# Patient Record
Sex: MALE | Race: BLACK OR AFRICAN AMERICAN | NOT HISPANIC OR LATINO | Employment: FULL TIME | URBAN - METROPOLITAN AREA
[De-identification: names, ages, dates, MRNs, and addresses within clinical notes are randomized per-mention and may not be internally consistent; named-entity substitution may affect disease eponyms.]

---

## 2017-05-31 ENCOUNTER — OFFICE VISIT (OUTPATIENT)
Dept: FAMILY MEDICINE | Facility: CLINIC | Age: 42
End: 2017-05-31
Payer: COMMERCIAL

## 2017-05-31 VITALS
SYSTOLIC BLOOD PRESSURE: 134 MMHG | BODY MASS INDEX: 26.27 KG/M2 | WEIGHT: 187.63 LBS | DIASTOLIC BLOOD PRESSURE: 80 MMHG | TEMPERATURE: 99 F | HEIGHT: 71 IN

## 2017-05-31 DIAGNOSIS — Z00.00 ROUTINE MEDICAL EXAM: Primary | ICD-10-CM

## 2017-05-31 DIAGNOSIS — R20.0 NUMBNESS OF RIGHT HAND: ICD-10-CM

## 2017-05-31 DIAGNOSIS — E66.3 OVERWEIGHT (BMI 25.0-29.9): ICD-10-CM

## 2017-05-31 PROCEDURE — 99386 PREV VISIT NEW AGE 40-64: CPT | Mod: S$GLB,,, | Performed by: INTERNAL MEDICINE

## 2017-05-31 PROCEDURE — 99999 PR PBB SHADOW E&M-EST. PATIENT-LVL III: CPT | Mod: PBBFAC,,, | Performed by: INTERNAL MEDICINE

## 2017-05-31 NOTE — PROGRESS NOTES
HISTORY OF PRESENT ILLNESS:  Marcial Downing is a 42 y.o. male who presents to the clinic today for a routine medical physical exam. He is new to our clinic.      PAST MEDICAL HISTORY:  History reviewed. No pertinent past medical history.    PAST SURGICAL HISTORY:  Past Surgical History:   Procedure Laterality Date    wisdom tooth removal  2014       SOCIAL HISTORY:  Social History     Social History    Marital status: Single     Spouse name: N/A    Number of children: 1    Years of education: N/A     Occupational History    cook      Social History Main Topics    Smoking status: Never Smoker    Smokeless tobacco: Not on file    Alcohol use No    Drug use:      Types: Marijuana    Sexual activity: Yes     Partners: Female     Other Topics Concern    Not on file     Social History Narrative    No narrative on file       FAMILY HISTORY:  Family History   Problem Relation Age of Onset    No Known Problems Mother     No Known Problems Father     No Known Problems Sister     No Known Problems Brother     Cancer Paternal Uncle     No Known Problems Son     Diabetes Neg Hx     Hypertension Neg Hx     Stroke Neg Hx        ALLERGIES AND MEDICATIONS: updated and reviewed.  Review of patient's allergies indicates:  No Known Allergies            SCREENING HISTORY:  Health Maintenance       Date Due Completion Date    Lipid Panel 1974 ---    TETANUS VACCINE 03/02/1992 ---    Influenza Vaccine 08/01/2017 ---            REVIEW OF SYSTEMS:  The patient reports fair dietary habits.  The patient does not exercise regularly.  General ROS: negative for - chills, fever, malaise or weight loss  Psychological ROS: negative for - anxiety, depression, memory difficulties, sleep disturbances or suicidal ideation  Ophthalmic ROS: negative for - blurry vision or eye pain  ENT ROS: negative for - epistaxis, headaches, nasal congestion, oral lesions or sore throat  Allergy and Immunology ROS: negative for -  "hives  Hematological and Lymphatic ROS: negative for - swollen lymph nodes  Endocrine ROS: negative for - polydipsia/polyuria or temperature intolerance  Respiratory ROS: no cough, shortness of breath, or wheezing  Cardiovascular ROS: no chest pain or dyspnea on exertion  Gastrointestinal ROS: no abdominal pain, change in bowel habits, or black or bloody stools  Dermatological ROS: negative for mole changes and rash  Musculoskeletal ROS: negative for - gait disturbance, joint stiffness, joint swelling, muscle pain or muscular weakness  Neurological ROS: no TIA or stroke symptoms  positive for - numbness/tingling in the 4th and 5th digit of his right hand x 6-12 months; no hx of trauma; no neck pain (he is left handed); no weakness  Genito-Urinary ROS: no dysuria, trouble voiding, or hematuria        PHYSICAL EXAM:  Vitals:    05/31/17 1034   BP: 134/80   Temp: 98.7 °F (37.1 °C)     Weight: 85.1 kg (187 lb 9.8 oz)   Height: 5' 10.5" (179.1 cm)   Body mass index is 26.54 kg/m².    Physical Examination: General appearance - alert, well appearing, and in no distress and overweight  Mental status - alert, oriented to person, place, and time, normal mood, behavior, speech, dress, motor activity, and thought processes  Eyes - pupils equal and reactive, extraocular eye movements intact, sclera anicteric  Mouth - mucous membranes moist, pharynx normal without lesions  Neck - supple, no significant adenopathy, carotids upstroke normal bilaterally, no bruits, thyroid exam: thyroid is normal in size without nodules or tenderness  Lymphatics - no palpable lymphadenopathy  Chest - clear to auscultation, no wheezes, rales or rhonchi, symmetric air entry  Heart - normal rate and regular rhythm, no gallops noted  Abdomen - soft, nontender, nondistended, no masses or organomegaly   Male - not examined  Back exam - full range of motion, no tenderness, palpable spasm or pain on motion  Neurological - alert, oriented, normal speech, " no focal findings or movement disorder noted, cranial nerves II through XII intact, negative Tinel's and Phalen's sign bilaterally  Musculoskeletal - no joint tenderness, deformity or swelling, no muscular tenderness noted, normal  strength bilaterally  Extremities - peripheral pulses normal, no pedal edema, no clubbing or cyanosis  Skin - normal coloration and turgor, no rashes, no suspicious skin lesions noted       ASSESSMENT AND PLAN:  1. Routine medical exam  Counseled on age appropriate medical preventative services including age appropriate cancer screenings, age appropriate eye and dental exams, over all nutritional health, need for a consistent exercise regimen, and an over all push towards maintaining a vigorous and active lifestyle.  Counseled on age appropriate vaccines and discussed upcoming health care needs based on age/gender. Discussed good sleep hygiene and stress management.   - CBC auto differential; Future  - Comprehensive metabolic panel; Future  - Lipid panel; Future    2. Numbness of right hand  I'm unsure if this is carpal tunnel syndrome or an ulnar nerve entrapment.  I will check an EMG and address results accordingly.  We may need to refer him to orthopedics for further treatment if symptoms worsen or persist.  - EMG w/ Ultrasound; Future    3.  Overweight  The patient is asked to make an attempt to improve diet and exercise patterns to aid in medical management of this problem.     Return in about 1 year (around 5/31/2018) for annual exam. or sooner as needed.

## 2017-06-05 ENCOUNTER — TELEPHONE (OUTPATIENT)
Dept: FAMILY MEDICINE | Facility: CLINIC | Age: 42
End: 2017-06-05

## 2017-06-07 ENCOUNTER — LAB VISIT (OUTPATIENT)
Dept: LAB | Facility: HOSPITAL | Age: 42
End: 2017-06-07
Attending: INTERNAL MEDICINE
Payer: COMMERCIAL

## 2017-06-07 DIAGNOSIS — Z00.00 ROUTINE MEDICAL EXAM: ICD-10-CM

## 2017-06-07 LAB
ALBUMIN SERPL BCP-MCNC: 3.8 G/DL
ALP SERPL-CCNC: 51 U/L
ALT SERPL W/O P-5'-P-CCNC: 13 U/L
ANION GAP SERPL CALC-SCNC: 6 MMOL/L
AST SERPL-CCNC: 20 U/L
BASOPHILS # BLD AUTO: 0.01 K/UL
BASOPHILS NFR BLD: 0.2 %
BILIRUB SERPL-MCNC: 0.4 MG/DL
BUN SERPL-MCNC: 18 MG/DL
CALCIUM SERPL-MCNC: 9.3 MG/DL
CHLORIDE SERPL-SCNC: 105 MMOL/L
CHOLEST/HDLC SERPL: 4.3 {RATIO}
CO2 SERPL-SCNC: 28 MMOL/L
CREAT SERPL-MCNC: 1.2 MG/DL
DIFFERENTIAL METHOD: ABNORMAL
EOSINOPHIL # BLD AUTO: 0.1 K/UL
EOSINOPHIL NFR BLD: 1.8 %
ERYTHROCYTE [DISTWIDTH] IN BLOOD BY AUTOMATED COUNT: 13.1 %
EST. GFR  (AFRICAN AMERICAN): >60 ML/MIN/1.73 M^2
EST. GFR  (NON AFRICAN AMERICAN): >60 ML/MIN/1.73 M^2
GLUCOSE SERPL-MCNC: 97 MG/DL
HCT VFR BLD AUTO: 43.8 %
HDL/CHOLESTEROL RATIO: 23 %
HDLC SERPL-MCNC: 178 MG/DL
HDLC SERPL-MCNC: 41 MG/DL
HGB BLD-MCNC: 14.7 G/DL
LDLC SERPL CALC-MCNC: 109.6 MG/DL
LYMPHOCYTES # BLD AUTO: 2.5 K/UL
LYMPHOCYTES NFR BLD: 47.7 %
MCH RBC QN AUTO: 28.2 PG
MCHC RBC AUTO-ENTMCNC: 33.6 %
MCV RBC AUTO: 84 FL
MONOCYTES # BLD AUTO: 0.6 K/UL
MONOCYTES NFR BLD: 12.3 %
NEUTROPHILS # BLD AUTO: 1.9 K/UL
NEUTROPHILS NFR BLD: 37.6 %
NONHDLC SERPL-MCNC: 137 MG/DL
PLATELET # BLD AUTO: 181 K/UL
PMV BLD AUTO: 11.5 FL
POTASSIUM SERPL-SCNC: 4 MMOL/L
PROT SERPL-MCNC: 6.9 G/DL
RBC # BLD AUTO: 5.22 M/UL
SODIUM SERPL-SCNC: 139 MMOL/L
TRIGL SERPL-MCNC: 137 MG/DL
WBC # BLD AUTO: 5.14 K/UL

## 2017-06-07 PROCEDURE — 85025 COMPLETE CBC W/AUTO DIFF WBC: CPT

## 2017-06-07 PROCEDURE — 36415 COLL VENOUS BLD VENIPUNCTURE: CPT | Mod: PO

## 2017-06-07 PROCEDURE — 80053 COMPREHEN METABOLIC PANEL: CPT

## 2017-06-07 PROCEDURE — 80061 LIPID PANEL: CPT

## 2017-06-09 ENCOUNTER — PROCEDURE VISIT (OUTPATIENT)
Dept: NEUROLOGY | Facility: CLINIC | Age: 42
End: 2017-06-09
Payer: COMMERCIAL

## 2017-06-09 VITALS — BODY MASS INDEX: 26.27 KG/M2 | TEMPERATURE: 99 F | HEIGHT: 71 IN | WEIGHT: 187.63 LBS

## 2017-06-09 DIAGNOSIS — R20.0 NUMBNESS OF RIGHT HAND: ICD-10-CM

## 2017-06-09 DIAGNOSIS — G56.03 CARPAL TUNNEL SYNDROME, BILATERAL: ICD-10-CM

## 2017-06-09 PROCEDURE — 95911 NRV CNDJ TEST 9-10 STUDIES: CPT | Mod: S$GLB,,, | Performed by: NEUROLOGICAL SURGERY

## 2017-06-09 PROCEDURE — 95886 MUSC TEST DONE W/N TEST COMP: CPT | Mod: S$GLB,,, | Performed by: NEUROLOGICAL SURGERY

## 2017-06-09 PROCEDURE — 99205 OFFICE O/P NEW HI 60 MIN: CPT | Mod: 25,S$GLB,, | Performed by: NEUROLOGICAL SURGERY

## 2017-06-09 NOTE — PROCEDURES
Chief Complaint   Patient presents with    Other     EMG        Carpal Tunnel Syndrome  Patient presents for evaluation of pain in hands, hand paresthesias and possible carpal tunnel syndrome. Onset of the symptoms was 2 years ago. Current symptoms include: pain involving the fingers of the hand, tingling/numbness involving the fingers of the hand and weakness involving the fingers of the hand. 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 Symptoms have gradually worsened. Evaluation to date: none. Treatment to date: none. He says that he is using his right hand more than his left though he is left handed since he works as a . He has noticed that every morning he has been having tingling in the fingers and feels that his ring finger on the right hand has been stuck requiring to have to use his other hand to open his fingers. He says that his symptoms began about two years ago. Her has not used any braces at night in the past. He has not family history of muscle or nerve diseases.    PAST MEDICAL HISTORY:  No past medical history on file.    PAST SURGICAL HISTORY:  Past Surgical History:   Procedure Laterality Date    wisdom tooth removal  2014       SOCIAL HISTORY:  Social History     Social History    Marital status: Single     Spouse name: N/A    Number of children: 1    Years of education: N/A     Occupational History    cook      Social History Main Topics    Smoking status: Never Smoker    Smokeless tobacco: Not on file    Alcohol use No    Drug use:      Types: Marijuana    Sexual activity: Yes     Partners: Female     Other Topics Concern    Not on file     Social History Narrative    No narrative on file       FAMILY HISTORY:  Family History   Problem Relation Age of Onset    No Known Problems Mother     No Known Problems Father     No Known Problems Sister     No Known Problems Brother     Cancer Paternal Uncle     No Known Problems Son      Diabetes Neg Hx     Hypertension Neg Hx     Stroke Neg Hx        ALLERGIES AND MEDICATIONS: updated and reviewed.  Review of patient's allergies indicates:  No Known Allergies  No current outpatient prescriptions on file.     No current facility-administered medications for this visit.        Review of Systems   Constitutional: Negative for activity change, fatigue and unexpected weight change.   HENT: Negative for trouble swallowing and voice change.    Eyes: Negative for photophobia, pain and visual disturbance.   Respiratory: Negative for apnea and shortness of breath.    Cardiovascular: Negative for chest pain and palpitations.   Gastrointestinal: Negative for constipation, nausea and vomiting.   Genitourinary: Negative for difficulty urinating.   Musculoskeletal: Positive for arthralgias. Negative for back pain, gait problem, myalgias and neck pain.   Skin: Negative for color change and rash.   Neurological: Positive for weakness and numbness. Negative for dizziness, seizures, syncope, speech difficulty, light-headedness and headaches.   Psychiatric/Behavioral: Negative for agitation, behavioral problems and confusion.       Neurologic Exam     Mental Status   Oriented to person, place, and time.   Registration: recalls 3 of 3 objects.   Attention: normal. Concentration: normal.   Speech: speech is normal   Level of consciousness: alert  Knowledge: good.     Cranial Nerves     CN II   Visual fields full to confrontation.   Right visual field deficit: none  Left visual field deficit: none     CN III, IV, VI   Pupils are equal, round, and reactive to light.  Extraocular motions are normal.   Right pupil: Size: 3 mm. Shape: regular. Accommodation: intact.   Left pupil: Size: 3 mm. Shape: regular. Accommodation: intact.   CN III: no CN III palsy  CN VI: no CN VI palsy  Nystagmus: none   Diplopia: none  Ophthalmoparesis: none  Upgaze: normal  Downgaze: normal  Conjugate gaze: present    CN V   Facial sensation  intact.   Right facial sensation deficit: none  Left facial sensation deficit: none    CN VII   Facial expression full, symmetric.   Right facial weakness: none  Left facial weakness: none    CN VIII   CN VIII normal.     CN IX, X   CN IX normal.   CN X normal.   Palate: symmetric    CN XI   CN XI normal.   Right sternocleidomastoid strength: normal  Left sternocleidomastoid strength: normal  Right trapezius strength: normal  Left trapezius strength: normal    CN XII   CN XII normal.   Tongue deviation: none    Motor Exam   Muscle bulk: normal  Overall muscle tone: normal  Right arm tone: normal  Left arm tone: normal  Right leg tone: normal  Left leg tone: normal    Strength   Strength 5/5 except as noted.   Right interossei: 4/5  Left interossei: 4/5    Sensory Exam   Right arm light touch: decreased from fingers  Left arm light touch: decreased from fingers  Right leg light touch: normal  Left leg light touch: normal  Right arm vibration: decreased from fingers  Left arm vibration: decreased from fingers  Right leg vibration: normal  Left leg vibration: normal  Right arm proprioception: normal  Left arm proprioception: normal  Right leg proprioception: normal  Left leg proprioception: normal  Right arm pinprick: decreased from fingers  Left arm pinprick: decreased from fingers  Right leg pinprick: normal  Left leg pinprick: normal  Sensory deficit distribution on right: median  Sensory deficit distribution on left: median    Gait, Coordination, and Reflexes     Gait  Gait: normal    Coordination   Romberg: negative  Finger to nose coordination: normal  Heel to shin coordination: normal  Tandem walking coordination: normal    Tremor   Resting tremor: absent    Reflexes   Right brachioradialis: 2+  Left brachioradialis: 2+  Right biceps: 2+  Left biceps: 2+  Right triceps: 2+  Left triceps: 2+  Right patellar: 2+  Left patellar: 2+  Right achilles: 2+  Left achilles: 2+  Right plantar: normal  Left plantar:  "normal      Physical Exam   Constitutional: He is oriented to person, place, and time. He appears well-developed and well-nourished.   HENT:   Head: Normocephalic and atraumatic.   Eyes: EOM are normal. Pupils are equal, round, and reactive to light.   Cardiovascular: Intact distal pulses.    Pulmonary/Chest: Effort normal. No respiratory distress.   Musculoskeletal: Normal range of motion.   Neurological: He is alert and oriented to person, place, and time. He has a normal Finger-Nose-Finger Test, a normal Heel to Shin Test, a normal Romberg Test and a normal Tandem Gait Test. Gait normal.   Reflex Scores:       Tricep reflexes are 2+ on the right side and 2+ on the left side.       Bicep reflexes are 2+ on the right side and 2+ on the left side.       Brachioradialis reflexes are 2+ on the right side and 2+ on the left side.       Patellar reflexes are 2+ on the right side and 2+ on the left side.       Achilles reflexes are 2+ on the right side and 2+ on the left side.  Skin: Skin is warm and dry.   Psychiatric: He has a normal mood and affect. His speech is normal and behavior is normal.       Vitals:    06/09/17 0945   Temp: 98.5 °F (36.9 °C)   Weight: 85.1 kg (187 lb 9.8 oz)   Height: 5' 10.5" (1.791 m)       Assessment & Plan:    Problem List Items Addressed This Visit     Carpal tunnel syndrome, bilateral    Overview     Mild-moderate CTS on the right. Moderate on the left.         Current Assessment & Plan     Discussed using wrist braces at night to help to minimize symptoms. Would likely benefit from evaluation with hand specialist.           Other Visit Diagnoses     Numbness of right hand            See EMG note in the chart  Follow-up: Return if symptoms worsen or fail to improve.  "

## 2017-06-09 NOTE — ASSESSMENT & PLAN NOTE
Discussed using wrist braces at night to help to minimize symptoms. Would likely benefit from evaluation with hand specialist.

## 2017-10-11 ENCOUNTER — HOSPITAL ENCOUNTER (EMERGENCY)
Facility: HOSPITAL | Age: 42
Discharge: HOME | End: 2017-10-11
Attending: EMERGENCY MEDICINE
Payer: COMMERCIAL

## 2017-10-11 VITALS
SYSTOLIC BLOOD PRESSURE: 138 MMHG | HEART RATE: 70 BPM | WEIGHT: 190 LBS | BODY MASS INDEX: 28.14 KG/M2 | RESPIRATION RATE: 16 BRPM | TEMPERATURE: 98 F | HEIGHT: 69 IN | OXYGEN SATURATION: 98 % | DIASTOLIC BLOOD PRESSURE: 70 MMHG

## 2017-10-11 DIAGNOSIS — S61.511A LACERATION OF RIGHT WRIST, INITIAL ENCOUNTER: Primary | ICD-10-CM

## 2017-10-11 PROCEDURE — 90715 TDAP VACCINE 7 YRS/> IM: CPT | Performed by: EMERGENCY MEDICINE

## 2017-10-11 PROCEDURE — 63600175 PHARM REV CODE 636 W HCPCS: Performed by: EMERGENCY MEDICINE

## 2017-10-11 PROCEDURE — 99283 EMERGENCY DEPT VISIT LOW MDM: CPT | Mod: 25

## 2017-10-11 PROCEDURE — 12031 INTMD RPR S/A/T/EXT 2.5 CM/<: CPT

## 2017-10-11 PROCEDURE — 25000003 PHARM REV CODE 250: Performed by: EMERGENCY MEDICINE

## 2017-10-11 PROCEDURE — 90471 IMMUNIZATION ADMIN: CPT | Performed by: EMERGENCY MEDICINE

## 2017-10-11 RX ORDER — CEPHALEXIN 500 MG/1
500 CAPSULE ORAL EVERY 12 HOURS
Qty: 10 CAPSULE | Refills: 0 | Status: SHIPPED | OUTPATIENT
Start: 2017-10-11 | End: 2017-10-16

## 2017-10-11 RX ORDER — BACITRACIN 500 [USP'U]/G
14 OINTMENT TOPICAL ONCE
Status: COMPLETED | OUTPATIENT
Start: 2017-10-11 | End: 2017-10-11

## 2017-10-11 RX ORDER — CEPHALEXIN 250 MG/1
500 CAPSULE ORAL
Status: COMPLETED | OUTPATIENT
Start: 2017-10-11 | End: 2017-10-11

## 2017-10-11 RX ORDER — LIDOCAINE HYDROCHLORIDE 10 MG/ML
10 INJECTION INFILTRATION; PERINEURAL
Status: COMPLETED | OUTPATIENT
Start: 2017-10-11 | End: 2017-10-11

## 2017-10-11 RX ADMIN — CLOSTRIDIUM TETANI TOXOID ANTIGEN (FORMALDEHYDE INACTIVATED), CORYNEBACTERIUM DIPHTHERIAE TOXOID ANTIGEN (FORMALDEHYDE INACTIVATED), BORDETELLA PERTUSSIS TOXOID ANTIGEN (GLUTARALDEHYDE INACTIVATED), BORDETELLA PERTUSSIS FILAMENTOUS HEMAGGLUTININ ANTIGEN (FORMALDEHYDE INACTIVATED), BORDETELLA PERTUSSIS PERTACTIN ANTIGEN, AND BORDETELLA PERTUSSIS FIMBRIAE 2/3 ANTIGEN 0.5 ML: 5; 2; 2.5; 5; 3; 5 INJECTION, SUSPENSION INTRAMUSCULAR at 10:10

## 2017-10-11 RX ADMIN — LIDOCAINE HYDROCHLORIDE 10 ML: 10 INJECTION, SOLUTION INFILTRATION; PERINEURAL at 10:10

## 2017-10-11 RX ADMIN — CEPHALEXIN 500 MG: 250 CAPSULE ORAL at 10:10

## 2017-10-11 RX ADMIN — BACITRACIN 14 G: 500 OINTMENT TOPICAL at 10:10

## 2017-10-12 NOTE — ED NOTES
Once tourniquet removed, hand with good cap refill, hand no longer cold, and now warm to touch; bleeding still controlled at this time with pressure dressing applied

## 2017-10-12 NOTE — DISCHARGE INSTRUCTIONS
Return for any redness, drainage, or fever.    Keep the wound dry and covered for the next two days.  Afterward, you can wear a bandage over the suture.      Always keep the wound covered at work.  Apply the ointment 2-4 times per day.

## 2017-10-12 NOTE — ED PROVIDER NOTES
"Encounter Date: 10/11/2017    SCRIBE #1 NOTE: I, Dominik Collazo, am scribing for, and in the presence of,  Blaze Walker MD. I have scribed the following portions of the note - Other sections scribed: HPI and ROS.       History     Chief Complaint   Patient presents with    Wrist Injury     injury to right wrist from drill; states had "bleeding squirting out of right wrist";  tourniquet applied prior to arrival; tourniquet removed in triage to assess bleeding, bleeding controlled now;      CC: Wrist Injury    HPI: This 42 y.o. M with no PMHx presents to ED for evaluation of a laceration to the R wrist with associated pain to the area. Pt reports he was using a hand-held drill when the bit slipped and punctured his wrist. He states the wound was initially "squirting." Bleeding now controlled. Last tetanus shot was over 10 yrs ago. No further sx. No tx attempted. Denies fever.      The history is provided by the patient. A  was used.     Review of patient's allergies indicates:  No Known Allergies  History reviewed. No pertinent past medical history.  Past Surgical History:   Procedure Laterality Date    wisdom tooth removal  2014     Family History   Problem Relation Age of Onset    No Known Problems Mother     No Known Problems Father     No Known Problems Sister     No Known Problems Brother     Cancer Paternal Uncle     No Known Problems Son     Diabetes Neg Hx     Hypertension Neg Hx     Stroke Neg Hx      Social History   Substance Use Topics    Smoking status: Never Smoker    Smokeless tobacco: Never Used    Alcohol use Yes     Review of Systems   Constitutional: Negative for diaphoresis and fever.   HENT: Negative for sore throat.    Eyes: Negative for visual disturbance.   Respiratory: Negative for shortness of breath.    Cardiovascular: Negative for chest pain.   Gastrointestinal: Negative for abdominal pain, diarrhea, nausea and vomiting.   Genitourinary: Negative for dysuria. "   Musculoskeletal: Negative for back pain.   Skin: Positive for wound (laceration). Negative for rash.   Neurological: Negative for weakness and headaches.   Hematological: Does not bruise/bleed easily.       Physical Exam     Initial Vitals [10/11/17 2115]   BP Pulse Resp Temp SpO2   119/80 68 16 99.2 °F (37.3 °C) 98 %      MAP       93         Physical Exam    Nursing note and vitals reviewed.  Constitutional: He appears well-developed and well-nourished.   HENT:   Head: Normocephalic and atraumatic.   Neck: Neck supple.   Pulmonary/Chest: No respiratory distress.   Musculoskeletal: Normal range of motion.   Neurological: He is alert.   Skin: Skin is warm and dry. Laceration (3mm to R wrist; overlying radial artery) noted.        Adequate hemostasis   Psychiatric: He has a normal mood and affect.         ED Course   Lac Repair  Date/Time: 10/11/2017 10:52 PM  Performed by: XENA PIERRE.  Authorized by: XENA PIERRE   Body area: upper extremity  Location details: right wrist  Laceration length: 0.3 cm  Tendon involvement: none  Nerve involvement: none  Vascular damage: no    Anesthesia:  Local Anesthetic: lidocaine 1% without epinephrine  Anesthetic total: 1 mL  Patient sedated: no  Preparation: Patient was prepped and draped in the usual sterile fashion.  Irrigation solution: saline  Amount of cleaning: extensive  Debridement: none  Degree of undermining: none  Skin closure: 4-0 Prolene  Technique: simple  Approximation: close  Approximation difficulty: simple  Dressing: 4x4 sterile gauze and antibiotic ointment (Coban)  Patient tolerance: Patient tolerated the procedure well with no immediate complications        Labs Reviewed - No data to display          Medical Decision Making:   ED Management:  Laceration repair            Scribe Attestation:   Scribe #1: I performed the above scribed service and the documentation accurately describes the services I performed. I attest to the accuracy of the  note.    Attending Attestation:           Physician Attestation for Scribe:  Physician Attestation Statement for Scribe #1: I, Blaze Walker MD, reviewed documentation, as scribed by Dominik Collazo in my presence, and it is both accurate and complete.                 ED Course      Clinical Impression:   The encounter diagnosis was Laceration of right wrist, initial encounter.    Disposition:   Disposition: Discharged  Condition: Stable                        Blaze Walker MD  10/11/17 4011

## 2021-02-24 ENCOUNTER — CLINICAL SUPPORT (OUTPATIENT)
Dept: OTHER | Facility: CLINIC | Age: 46
End: 2021-02-24

## 2021-02-24 DIAGNOSIS — Z00.8 ENCOUNTER FOR OTHER GENERAL EXAMINATION: ICD-10-CM

## 2021-02-25 VITALS — HEIGHT: 68 IN | BODY MASS INDEX: 28.89 KG/M2

## 2021-02-25 LAB
GLUCOSE SERPL-MCNC: 134 MG/DL (ref 60–140)
HDLC SERPL-MCNC: 40 MG/DL
POC CHOLESTEROL, LDL (DOCK): 114 MG/DL
POC CHOLESTEROL, TOTAL: 207 MG/DL
TRIGL SERPL-MCNC: 270 MG/DL

## 2021-05-12 ENCOUNTER — PATIENT MESSAGE (OUTPATIENT)
Dept: RESEARCH | Facility: HOSPITAL | Age: 46
End: 2021-05-12

## 2022-01-31 NOTE — ED NOTES
Applied dressing to patients wrist    Quality 111:Pneumonia Vaccination Status For Older Adults: Pneumococcal Vaccination not Administered or Previously Received, Reason not Otherwise Specified Quality 110: Preventive Care And Screening: Influenza Immunization: Influenza Immunization not Administered because Patient Refused. Quality 431: Preventive Care And Screening: Unhealthy Alcohol Use - Screening: Patient not identified as an unhealthy alcohol user when screened for unhealthy alcohol use using a systematic screening method Quality 130: Documentation Of Current Medications In The Medical Record: Current Medications Documented Quality 402: Tobacco Use And Help With Quitting Among Adolescents: Patient screened for tobacco and never smoked Detail Level: Detailed Quality 226: Preventive Care And Screening: Tobacco Use: Screening And Cessation Intervention: Patient screened for tobacco use and is an ex/non-smoker

## 2022-02-09 ENCOUNTER — CLINICAL SUPPORT (OUTPATIENT)
Dept: OTHER | Facility: CLINIC | Age: 47
End: 2022-02-09

## 2022-02-09 DIAGNOSIS — Z00.8 ENCOUNTER FOR OTHER GENERAL EXAMINATION: ICD-10-CM

## 2022-02-10 VITALS — HEIGHT: 68 IN | BODY MASS INDEX: 28.89 KG/M2

## 2022-02-10 LAB
HDLC SERPL-MCNC: 33 MG/DL
POC CHOLESTEROL, LDL (DOCK): 113 MG/DL
POC CHOLESTEROL, TOTAL: 186 MG/DL
POC GLUCOSE, FASTING: 108 MG/DL (ref 60–110)
TRIGL SERPL-MCNC: 198 MG/DL

## 2022-09-09 ENCOUNTER — PATIENT MESSAGE (OUTPATIENT)
Dept: FAMILY MEDICINE | Facility: CLINIC | Age: 47
End: 2022-09-09
Payer: COMMERCIAL

## 2022-09-09 DIAGNOSIS — Z11.59 NEED FOR HEPATITIS C SCREENING TEST: ICD-10-CM

## 2022-09-09 DIAGNOSIS — R73.9 ELEVATED BLOOD SUGAR: ICD-10-CM

## 2022-09-09 DIAGNOSIS — Z11.4 SCREENING FOR HIV (HUMAN IMMUNODEFICIENCY VIRUS): ICD-10-CM

## 2022-09-09 DIAGNOSIS — Z00.00 ROUTINE MEDICAL EXAM: Primary | ICD-10-CM

## 2022-09-13 ENCOUNTER — TELEPHONE (OUTPATIENT)
Dept: FAMILY MEDICINE | Facility: CLINIC | Age: 47
End: 2022-09-13
Payer: COMMERCIAL

## 2022-09-19 ENCOUNTER — OFFICE VISIT (OUTPATIENT)
Dept: FAMILY MEDICINE | Facility: CLINIC | Age: 47
End: 2022-09-19
Payer: COMMERCIAL

## 2022-09-19 ENCOUNTER — LAB VISIT (OUTPATIENT)
Dept: LAB | Facility: HOSPITAL | Age: 47
End: 2022-09-19
Attending: INTERNAL MEDICINE
Payer: COMMERCIAL

## 2022-09-19 VITALS
OXYGEN SATURATION: 97 % | BODY MASS INDEX: 29.45 KG/M2 | WEIGHT: 205.69 LBS | HEIGHT: 70 IN | HEART RATE: 66 BPM | DIASTOLIC BLOOD PRESSURE: 76 MMHG | SYSTOLIC BLOOD PRESSURE: 116 MMHG | TEMPERATURE: 99 F

## 2022-09-19 DIAGNOSIS — Z23 FLU VACCINE NEED: ICD-10-CM

## 2022-09-19 DIAGNOSIS — Z00.00 ROUTINE MEDICAL EXAM: ICD-10-CM

## 2022-09-19 DIAGNOSIS — G89.29 CHRONIC PAIN OF BOTH KNEES: ICD-10-CM

## 2022-09-19 DIAGNOSIS — Z11.59 NEED FOR HEPATITIS C SCREENING TEST: ICD-10-CM

## 2022-09-19 DIAGNOSIS — Z12.11 COLON CANCER SCREENING: ICD-10-CM

## 2022-09-19 DIAGNOSIS — Z00.00 ROUTINE MEDICAL EXAM: Primary | ICD-10-CM

## 2022-09-19 DIAGNOSIS — R73.9 ELEVATED BLOOD SUGAR: ICD-10-CM

## 2022-09-19 DIAGNOSIS — M25.562 CHRONIC PAIN OF BOTH KNEES: ICD-10-CM

## 2022-09-19 DIAGNOSIS — Z11.4 SCREENING FOR HIV (HUMAN IMMUNODEFICIENCY VIRUS): ICD-10-CM

## 2022-09-19 DIAGNOSIS — E66.3 OVERWEIGHT (BMI 25.0-29.9): ICD-10-CM

## 2022-09-19 DIAGNOSIS — M25.561 CHRONIC PAIN OF BOTH KNEES: ICD-10-CM

## 2022-09-19 LAB
ALBUMIN SERPL BCP-MCNC: 4 G/DL (ref 3.5–5.2)
ALP SERPL-CCNC: 56 U/L (ref 55–135)
ALT SERPL W/O P-5'-P-CCNC: 17 U/L (ref 10–44)
ANION GAP SERPL CALC-SCNC: 10 MMOL/L (ref 8–16)
AST SERPL-CCNC: 22 U/L (ref 10–40)
BASOPHILS # BLD AUTO: 0.04 K/UL (ref 0–0.2)
BASOPHILS NFR BLD: 0.7 % (ref 0–1.9)
BILIRUB SERPL-MCNC: 0.3 MG/DL (ref 0.1–1)
BUN SERPL-MCNC: 15 MG/DL (ref 6–20)
CALCIUM SERPL-MCNC: 9.2 MG/DL (ref 8.7–10.5)
CHLORIDE SERPL-SCNC: 103 MMOL/L (ref 95–110)
CO2 SERPL-SCNC: 26 MMOL/L (ref 23–29)
CREAT SERPL-MCNC: 1.2 MG/DL (ref 0.5–1.4)
DIFFERENTIAL METHOD: ABNORMAL
EOSINOPHIL # BLD AUTO: 0.2 K/UL (ref 0–0.5)
EOSINOPHIL NFR BLD: 2.8 % (ref 0–8)
ERYTHROCYTE [DISTWIDTH] IN BLOOD BY AUTOMATED COUNT: 14.3 % (ref 11.5–14.5)
EST. GFR  (NO RACE VARIABLE): >60 ML/MIN/1.73 M^2
ESTIMATED AVG GLUCOSE: 111 MG/DL (ref 68–131)
GLUCOSE SERPL-MCNC: 99 MG/DL (ref 70–110)
HBA1C MFR BLD: 5.5 % (ref 4–5.6)
HCT VFR BLD AUTO: 45.8 % (ref 40–54)
HGB BLD-MCNC: 14.9 G/DL (ref 14–18)
IMM GRANULOCYTES # BLD AUTO: 0.04 K/UL (ref 0–0.04)
IMM GRANULOCYTES NFR BLD AUTO: 0.7 % (ref 0–0.5)
LYMPHOCYTES # BLD AUTO: 2.6 K/UL (ref 1–4.8)
LYMPHOCYTES NFR BLD: 45.5 % (ref 18–48)
MCH RBC QN AUTO: 27.4 PG (ref 27–31)
MCHC RBC AUTO-ENTMCNC: 32.5 G/DL (ref 32–36)
MCV RBC AUTO: 84 FL (ref 82–98)
MONOCYTES # BLD AUTO: 0.5 K/UL (ref 0.3–1)
MONOCYTES NFR BLD: 9.1 % (ref 4–15)
NEUTROPHILS # BLD AUTO: 2.4 K/UL (ref 1.8–7.7)
NEUTROPHILS NFR BLD: 41.2 % (ref 38–73)
NRBC BLD-RTO: 0 /100 WBC
PLATELET # BLD AUTO: 209 K/UL (ref 150–450)
PMV BLD AUTO: 12.2 FL (ref 9.2–12.9)
POTASSIUM SERPL-SCNC: 4.1 MMOL/L (ref 3.5–5.1)
PROT SERPL-MCNC: 7.1 G/DL (ref 6–8.4)
RBC # BLD AUTO: 5.43 M/UL (ref 4.6–6.2)
SODIUM SERPL-SCNC: 139 MMOL/L (ref 136–145)
WBC # BLD AUTO: 5.8 K/UL (ref 3.9–12.7)

## 2022-09-19 PROCEDURE — 99396 PR PREVENTIVE VISIT,EST,40-64: ICD-10-PCS | Mod: 25,S$GLB,, | Performed by: INTERNAL MEDICINE

## 2022-09-19 PROCEDURE — 3044F PR MOST RECENT HEMOGLOBIN A1C LEVEL <7.0%: ICD-10-PCS | Mod: CPTII,S$GLB,, | Performed by: INTERNAL MEDICINE

## 2022-09-19 PROCEDURE — 83036 HEMOGLOBIN GLYCOSYLATED A1C: CPT | Performed by: INTERNAL MEDICINE

## 2022-09-19 PROCEDURE — 1160F RVW MEDS BY RX/DR IN RCRD: CPT | Mod: CPTII,S$GLB,, | Performed by: INTERNAL MEDICINE

## 2022-09-19 PROCEDURE — 36415 COLL VENOUS BLD VENIPUNCTURE: CPT | Mod: PO | Performed by: INTERNAL MEDICINE

## 2022-09-19 PROCEDURE — 1159F PR MEDICATION LIST DOCUMENTED IN MEDICAL RECORD: ICD-10-PCS | Mod: CPTII,S$GLB,, | Performed by: INTERNAL MEDICINE

## 2022-09-19 PROCEDURE — 1159F MED LIST DOCD IN RCRD: CPT | Mod: CPTII,S$GLB,, | Performed by: INTERNAL MEDICINE

## 2022-09-19 PROCEDURE — 87389 HIV-1 AG W/HIV-1&-2 AB AG IA: CPT | Performed by: INTERNAL MEDICINE

## 2022-09-19 PROCEDURE — 99999 PR PBB SHADOW E&M-EST. PATIENT-LVL V: CPT | Mod: PBBFAC,,, | Performed by: INTERNAL MEDICINE

## 2022-09-19 PROCEDURE — 3008F BODY MASS INDEX DOCD: CPT | Mod: CPTII,S$GLB,, | Performed by: INTERNAL MEDICINE

## 2022-09-19 PROCEDURE — 90686 FLU VACCINE (QUAD) GREATER THAN OR EQUAL TO 3YO PRESERVATIVE FREE IM: ICD-10-PCS | Mod: S$GLB,,, | Performed by: INTERNAL MEDICINE

## 2022-09-19 PROCEDURE — 99999 PR PBB SHADOW E&M-EST. PATIENT-LVL V: ICD-10-PCS | Mod: PBBFAC,,, | Performed by: INTERNAL MEDICINE

## 2022-09-19 PROCEDURE — 99396 PREV VISIT EST AGE 40-64: CPT | Mod: 25,S$GLB,, | Performed by: INTERNAL MEDICINE

## 2022-09-19 PROCEDURE — 3078F PR MOST RECENT DIASTOLIC BLOOD PRESSURE < 80 MM HG: ICD-10-PCS | Mod: CPTII,S$GLB,, | Performed by: INTERNAL MEDICINE

## 2022-09-19 PROCEDURE — 1160F PR REVIEW ALL MEDS BY PRESCRIBER/CLIN PHARMACIST DOCUMENTED: ICD-10-PCS | Mod: CPTII,S$GLB,, | Performed by: INTERNAL MEDICINE

## 2022-09-19 PROCEDURE — 80053 COMPREHEN METABOLIC PANEL: CPT | Performed by: INTERNAL MEDICINE

## 2022-09-19 PROCEDURE — 3044F HG A1C LEVEL LT 7.0%: CPT | Mod: CPTII,S$GLB,, | Performed by: INTERNAL MEDICINE

## 2022-09-19 PROCEDURE — 90686 IIV4 VACC NO PRSV 0.5 ML IM: CPT | Mod: S$GLB,,, | Performed by: INTERNAL MEDICINE

## 2022-09-19 PROCEDURE — 90471 FLU VACCINE (QUAD) GREATER THAN OR EQUAL TO 3YO PRESERVATIVE FREE IM: ICD-10-PCS | Mod: S$GLB,,, | Performed by: INTERNAL MEDICINE

## 2022-09-19 PROCEDURE — 3074F SYST BP LT 130 MM HG: CPT | Mod: CPTII,S$GLB,, | Performed by: INTERNAL MEDICINE

## 2022-09-19 PROCEDURE — 90471 IMMUNIZATION ADMIN: CPT | Mod: S$GLB,,, | Performed by: INTERNAL MEDICINE

## 2022-09-19 PROCEDURE — 3008F PR BODY MASS INDEX (BMI) DOCUMENTED: ICD-10-PCS | Mod: CPTII,S$GLB,, | Performed by: INTERNAL MEDICINE

## 2022-09-19 PROCEDURE — 3074F PR MOST RECENT SYSTOLIC BLOOD PRESSURE < 130 MM HG: ICD-10-PCS | Mod: CPTII,S$GLB,, | Performed by: INTERNAL MEDICINE

## 2022-09-19 PROCEDURE — 86803 HEPATITIS C AB TEST: CPT | Performed by: INTERNAL MEDICINE

## 2022-09-19 PROCEDURE — 3078F DIAST BP <80 MM HG: CPT | Mod: CPTII,S$GLB,, | Performed by: INTERNAL MEDICINE

## 2022-09-19 PROCEDURE — 85025 COMPLETE CBC W/AUTO DIFF WBC: CPT | Performed by: INTERNAL MEDICINE

## 2022-09-19 NOTE — PATIENT INSTRUCTIONS
"Estimated body mass index is 29.35 kg/m² as calculated from the following:    Height as of this encounter: 5' 10.2" (1.783 m).    Weight as of this encounter: 93.3 kg (205 lb 11 oz).    For Adults 20 Years and Older:    BMI Weight Status   Below 18.5 Underweight   18.6-24.9 Normal/Healthy   25.0-29.9 Overweight   30.0 & Above Obese     Ideal Weight Range for Your Height: 5'10" = 132 - 173 lbs     "

## 2022-09-19 NOTE — PROGRESS NOTES
HISTORY OF PRESENT ILLNESS:  Marcial Downing is a 47 y.o. male who presents to the clinic today for a routine medical physical exam. His last physical exam was approximately 1 years(s) ago.      PAST MEDICAL HISTORY:  History reviewed. No pertinent past medical history.    PAST SURGICAL HISTORY:  Past Surgical History:   Procedure Laterality Date    wisdom tooth removal  2014       SOCIAL HISTORY:  Social History     Socioeconomic History    Marital status: Single    Number of children: 1   Occupational History    Occupation: [employer]   Tobacco Use    Smoking status: Never    Smokeless tobacco: Never   Substance and Sexual Activity    Alcohol use: Yes    Drug use: Yes     Types: Marijuana    Sexual activity: Yes     Partners: Female       FAMILY HISTORY:  Family History   Problem Relation Age of Onset    No Known Problems Mother     No Known Problems Father     No Known Problems Sister     No Known Problems Brother     Cancer Paternal Uncle     No Known Problems Son     Diabetes Neg Hx     Hypertension Neg Hx     Stroke Neg Hx        ALLERGIES AND MEDICATIONS: updated and reviewed.  Review of patient's allergies indicates:  No Known Allergies        CARE TEAM:  Patient Care Team:  Silvia Park MD as PCP - General (Internal Medicine)           SCREENING HISTORY:  Health Maintenance         Date Due Completion Date    Hepatitis C Screening Never done ---    HIV Screening Never done ---    Colorectal Cancer Screening Never done ---    COVID-19 Vaccine (3 - Booster for Pfizer series) 02/23/2022 9/23/2021    Lipid Panel 02/09/2027 2/9/2022    TETANUS VACCINE 10/11/2027 10/11/2017              REVIEW OF SYSTEMS:   The patient reports : fair dietary habits.  The patient reports  : that they do not exercise regularly  Review of Systems   Constitutional:  Negative for chills, fatigue, fever and unexpected weight change.   HENT:  Negative for congestion, ear pain, sore throat and voice change.    Eyes:  Negative for  "photophobia, pain, discharge and visual disturbance.   Respiratory:  Negative for cough, shortness of breath and wheezing.    Cardiovascular:  Negative for chest pain, palpitations and leg swelling.   Gastrointestinal:  Negative for abdominal pain, blood in stool, constipation, diarrhea, nausea and vomiting.   Genitourinary:  Negative for dysuria and frequency.   Musculoskeletal:  Positive for arthralgias (- chronic mild bilateral knee pain). Negative for gait problem, joint swelling and neck stiffness.   Skin:  Negative for color change and rash.   Neurological:  Negative for seizures, weakness and headaches.   Hematological:  Negative for adenopathy. Does not bruise/bleed easily.   Psychiatric/Behavioral:  Negative for behavioral problems and dysphoric mood. The patient is not nervous/anxious.    ROS : patient denies: difficulty initiating urination          PHYSICAL EXAM:  274}  Vitals:    09/19/22 1454   BP: 116/76   Pulse: 66   Temp: 98.6 °F (37 °C)     Weight: 93.3 kg (205 lb 11 oz)   Height: 5' 10.2" (178.3 cm)   Body mass index is 29.35 kg/m².    General appearance - alert, well appearing, and in no distress, overweight  Psychiatric - alert, oriented to person, place, and time, normal behavior, speech, dress, motor activity and thought process  Eyes - pupils equal and reactive, extraocular eye movements intact, sclera anicteric  Mouth - not examined; patient wearing mask due to Covid 19 pandemic  Neck - supple, no significant adenopathy, carotids upstroke normal bilaterally, no bruits  Lymphatics - no palpable cervical lymphadenopathy  Chest - clear to auscultation, no wheezes, rales or rhonchi, symmetric air entry  Heart - normal rate and regular rhythm, no gallops noted  Neurological - alert, normal speech, no focal findings or movement disorder noted, cranial nerves II through XII intact  Musculoskeletal - no joint tenderness, deformity or swelling, no muscular tenderness noted  Extremities - peripheral " pulses normal, no pedal edema, no clubbing or cyanosis  Skin - normal coloration and turgor, no rashes, no suspicious skin lesions noted      Labs:  Ordered/Scheduled        ASSESSMENT AND PLAN: 274}  1. Routine medical exam  Counseled on age appropriate medical preventative services including age appropriate cancer screenings, age appropriate eye and dental exams, over all nutritional health, need for a consistent exercise regimen, and an over all push towards maintaining a vigorous and active lifestyle.  Counseled on age appropriate vaccines and discussed upcoming health care needs based on age/gender. Discussed good sleep hygiene and stress management.      2. Overweight (BMI 25.0-29.9)  BMI Readings from Last 3 Encounters:   09/19/22 29.35 kg/m²   02/09/22 28.89 kg/m²   02/24/21 28.89 kg/m²     The patient is asked to make an attempt to improve diet and exercise patterns to aid in medical management of this problem.      3. Flu vaccine need    - Influenza - Quadrivalent *Preferred* (6 months+) (PF)    4. Colon cancer screening    - Ambulatory referral/consult to Endo Procedure ; Future    5. Chronic pain of both knees  Tylenol as needed for pain. I suspect mild arthritis and advise weight loss would help. He is taking NSAID daily for pain - will refer to ortho for further evaluation.  Patient was counseled that the chronic use of anti-inflammatory medication can increase the risk of GI bleed and cardiovascular events (stroke and heart attack) as well as cause kidney damage and increase blood pressure.    - Ambulatory referral/consult to Orthopedics; Future         Orders Placed This Encounter   Procedures    Influenza - Quadrivalent *Preferred* (6 months+) (PF)    Ambulatory referral/consult to Endo Procedure     Ambulatory referral/consult to Orthopedics       Follow up in about 1 year (around 9/19/2023) for annual exam. or sooner as needed.

## 2022-09-20 ENCOUNTER — PATIENT MESSAGE (OUTPATIENT)
Dept: FAMILY MEDICINE | Facility: CLINIC | Age: 47
End: 2022-09-20
Payer: COMMERCIAL

## 2022-09-20 LAB
HCV AB SERPL QL IA: NORMAL
HIV 1+2 AB+HIV1 P24 AG SERPL QL IA: NORMAL

## 2022-09-21 DIAGNOSIS — M25.562 PAIN IN BOTH KNEES, UNSPECIFIED CHRONICITY: Primary | ICD-10-CM

## 2022-09-21 DIAGNOSIS — M25.561 PAIN IN BOTH KNEES, UNSPECIFIED CHRONICITY: Primary | ICD-10-CM

## 2022-09-23 ENCOUNTER — PATIENT MESSAGE (OUTPATIENT)
Dept: ADMINISTRATIVE | Facility: HOSPITAL | Age: 47
End: 2022-09-23
Payer: COMMERCIAL

## 2024-06-27 ENCOUNTER — LAB VISIT (OUTPATIENT)
Dept: LAB | Facility: HOSPITAL | Age: 49
End: 2024-06-27
Payer: COMMERCIAL

## 2024-06-27 ENCOUNTER — OFFICE VISIT (OUTPATIENT)
Dept: FAMILY MEDICINE | Facility: CLINIC | Age: 49
End: 2024-06-27
Payer: COMMERCIAL

## 2024-06-27 VITALS
WEIGHT: 223.69 LBS | SYSTOLIC BLOOD PRESSURE: 126 MMHG | DIASTOLIC BLOOD PRESSURE: 82 MMHG | HEIGHT: 70 IN | BODY MASS INDEX: 32.02 KG/M2 | OXYGEN SATURATION: 96 % | TEMPERATURE: 99 F | HEART RATE: 76 BPM

## 2024-06-27 DIAGNOSIS — Z00.00 ANNUAL PHYSICAL EXAM: ICD-10-CM

## 2024-06-27 DIAGNOSIS — M25.562 CHRONIC PAIN OF BOTH KNEES: ICD-10-CM

## 2024-06-27 DIAGNOSIS — G89.29 CHRONIC PAIN OF BOTH KNEES: ICD-10-CM

## 2024-06-27 DIAGNOSIS — Z00.00 ANNUAL PHYSICAL EXAM: Primary | ICD-10-CM

## 2024-06-27 DIAGNOSIS — M25.561 CHRONIC PAIN OF BOTH KNEES: ICD-10-CM

## 2024-06-27 DIAGNOSIS — Z12.11 COLON CANCER SCREENING: ICD-10-CM

## 2024-06-27 LAB
ALBUMIN SERPL BCP-MCNC: 4 G/DL (ref 3.5–5.2)
ALP SERPL-CCNC: 60 U/L (ref 55–135)
ALT SERPL W/O P-5'-P-CCNC: 21 U/L (ref 10–44)
ANION GAP SERPL CALC-SCNC: 7 MMOL/L (ref 8–16)
AST SERPL-CCNC: 20 U/L (ref 10–40)
BASOPHILS # BLD AUTO: 0.02 K/UL (ref 0–0.2)
BASOPHILS NFR BLD: 0.4 % (ref 0–1.9)
BILIRUB SERPL-MCNC: 0.3 MG/DL (ref 0.1–1)
BUN SERPL-MCNC: 11 MG/DL (ref 6–20)
CALCIUM SERPL-MCNC: 9.2 MG/DL (ref 8.7–10.5)
CHLORIDE SERPL-SCNC: 106 MMOL/L (ref 95–110)
CHOLEST SERPL-MCNC: 195 MG/DL (ref 120–199)
CHOLEST/HDLC SERPL: 5.7 {RATIO} (ref 2–5)
CO2 SERPL-SCNC: 25 MMOL/L (ref 23–29)
CREAT SERPL-MCNC: 1.2 MG/DL (ref 0.5–1.4)
DIFFERENTIAL METHOD BLD: ABNORMAL
EOSINOPHIL # BLD AUTO: 0.1 K/UL (ref 0–0.5)
EOSINOPHIL NFR BLD: 1.7 % (ref 0–8)
ERYTHROCYTE [DISTWIDTH] IN BLOOD BY AUTOMATED COUNT: 13.6 % (ref 11.5–14.5)
EST. GFR  (NO RACE VARIABLE): >60 ML/MIN/1.73 M^2
ESTIMATED AVG GLUCOSE: 123 MG/DL (ref 68–131)
GLUCOSE SERPL-MCNC: 94 MG/DL (ref 70–110)
HAV IGM SERPL QL IA: NORMAL
HBA1C MFR BLD: 5.9 % (ref 4–5.6)
HBV CORE IGM SERPL QL IA: NORMAL
HBV SURFACE AG SERPL QL IA: NORMAL
HCT VFR BLD AUTO: 46 % (ref 40–54)
HCV AB SERPL QL IA: NORMAL
HDLC SERPL-MCNC: 34 MG/DL (ref 40–75)
HDLC SERPL: 17.4 % (ref 20–50)
HGB BLD-MCNC: 15 G/DL (ref 14–18)
HIV 1+2 AB+HIV1 P24 AG SERPL QL IA: NORMAL
IMM GRANULOCYTES # BLD AUTO: 0.04 K/UL (ref 0–0.04)
IMM GRANULOCYTES NFR BLD AUTO: 0.7 % (ref 0–0.5)
LDLC SERPL CALC-MCNC: 109.6 MG/DL (ref 63–159)
LYMPHOCYTES # BLD AUTO: 2.2 K/UL (ref 1–4.8)
LYMPHOCYTES NFR BLD: 41.6 % (ref 18–48)
MCH RBC QN AUTO: 27.9 PG (ref 27–31)
MCHC RBC AUTO-ENTMCNC: 32.6 G/DL (ref 32–36)
MCV RBC AUTO: 86 FL (ref 82–98)
MONOCYTES # BLD AUTO: 0.5 K/UL (ref 0.3–1)
MONOCYTES NFR BLD: 9.6 % (ref 4–15)
NEUTROPHILS # BLD AUTO: 2.5 K/UL (ref 1.8–7.7)
NEUTROPHILS NFR BLD: 46 % (ref 38–73)
NONHDLC SERPL-MCNC: 161 MG/DL
NRBC BLD-RTO: 0 /100 WBC
PLATELET # BLD AUTO: 176 K/UL (ref 150–450)
PMV BLD AUTO: 12.3 FL (ref 9.2–12.9)
POTASSIUM SERPL-SCNC: 4 MMOL/L (ref 3.5–5.1)
PROT SERPL-MCNC: 7.3 G/DL (ref 6–8.4)
RBC # BLD AUTO: 5.37 M/UL (ref 4.6–6.2)
SODIUM SERPL-SCNC: 138 MMOL/L (ref 136–145)
TESTOST SERPL-MCNC: 383 NG/DL (ref 304–1227)
TRIGL SERPL-MCNC: 257 MG/DL (ref 30–150)
TSH SERPL DL<=0.005 MIU/L-ACNC: 0.95 UIU/ML (ref 0.4–4)
WBC # BLD AUTO: 5.34 K/UL (ref 3.9–12.7)

## 2024-06-27 PROCEDURE — 80074 ACUTE HEPATITIS PANEL: CPT

## 2024-06-27 PROCEDURE — 1160F RVW MEDS BY RX/DR IN RCRD: CPT | Mod: CPTII,S$GLB,,

## 2024-06-27 PROCEDURE — 36415 COLL VENOUS BLD VENIPUNCTURE: CPT | Mod: PO

## 2024-06-27 PROCEDURE — 83036 HEMOGLOBIN GLYCOSYLATED A1C: CPT

## 2024-06-27 PROCEDURE — 80053 COMPREHEN METABOLIC PANEL: CPT

## 2024-06-27 PROCEDURE — 99999 PR PBB SHADOW E&M-EST. PATIENT-LVL IV: CPT | Mod: PBBFAC,,,

## 2024-06-27 PROCEDURE — 3044F HG A1C LEVEL LT 7.0%: CPT | Mod: CPTII,S$GLB,,

## 2024-06-27 PROCEDURE — 87389 HIV-1 AG W/HIV-1&-2 AB AG IA: CPT

## 2024-06-27 PROCEDURE — 84403 ASSAY OF TOTAL TESTOSTERONE: CPT

## 2024-06-27 PROCEDURE — 85025 COMPLETE CBC W/AUTO DIFF WBC: CPT

## 2024-06-27 PROCEDURE — 84443 ASSAY THYROID STIM HORMONE: CPT

## 2024-06-27 PROCEDURE — 1159F MED LIST DOCD IN RCRD: CPT | Mod: CPTII,S$GLB,,

## 2024-06-27 PROCEDURE — 3008F BODY MASS INDEX DOCD: CPT | Mod: CPTII,S$GLB,,

## 2024-06-27 PROCEDURE — 99396 PREV VISIT EST AGE 40-64: CPT | Mod: S$GLB,,,

## 2024-06-27 PROCEDURE — 80061 LIPID PANEL: CPT

## 2024-06-27 PROCEDURE — 3074F SYST BP LT 130 MM HG: CPT | Mod: CPTII,S$GLB,,

## 2024-06-27 PROCEDURE — 3079F DIAST BP 80-89 MM HG: CPT | Mod: CPTII,S$GLB,,

## 2024-06-27 NOTE — PROGRESS NOTES
HPI     Chief Complaint:  Chief Complaint   Patient presents with    Annual Exam       Marcial Downing is a 49 y.o. male with multiple medical diagnoses as listed in the medical history and problem list that presents for   Chief Complaint   Patient presents with    Annual Exam    .     Patient is not known to me with his last appointment in this department on Visit date not found.     HPI  Pt presents for annual exam.  Complains of bilateral knee pain, requesting referral to orthopedics.    Assessment & Plan     Problem List Items Addressed This Visit    None  Visit Diagnoses       Annual physical exam    -  Primary  Counseled on age appropriate medical preventative services including age appropriate cancer screenings, age appropriate eye and dental exams, over all nutritional health, need for a consistent exercise regimen, and an over all push towards maintaining a vigorous and active lifestyle.  Counseled on age appropriate vaccines and discussed upcoming health care needs based on age/gender. Discussed good sleep hygiene and stress management.  Will order routine fasting labs including STD testing.    Relevant Orders    Hemoglobin A1C    Lipid Panel (Completed)    Comprehensive Metabolic Panel (Completed)    CBC Auto Differential (Completed)    Testosterone    HIV 1/2 Ag/Ab (4th Gen)    RPR    HEPATITIS PANEL, ACUTE    TSH    C. trachomatis/N. gonorrhoeae by AMP DNA    Colon cancer screening      Due for colon cancer screening, we will order colonoscopy.    Relevant Orders    Ambulatory referral/consult to Endo Procedure     Chronic pain of both knees      Bilateral knee pain for several months.  Requesting further evaluation with Orthopedics.  We will refer to orthopedics.    Relevant Orders    Ambulatory referral/consult to Orthopedics              --------------------------------------------      Health Maintenance:  Health Maintenance         Date Due Completion Date    Colorectal Cancer Screening  Never done ---    COVID-19 Vaccine (3 - 2023-24 season) 09/01/2023 9/23/2021    Shingles Vaccine (1 of 2) 06/27/2025 (Originally 3/2/2024) ---    Influenza Vaccine (Season Ended) 09/01/2024 9/19/2022    Hemoglobin A1c (Diabetic Prevention Screening) 09/19/2025 9/19/2022    TETANUS VACCINE 10/11/2027 10/11/2017    Lipid Panel 06/27/2029 6/27/2024            Health maintenance reviewed, Colon cancer screening ordered, HIV screening ordered, Lipid panel ordered, and A1c ordered    Follow Up:  No follow-ups on file.    Exam     Review of Systems:  (as noted above)  Review of Systems   Constitutional:  Negative for activity change and unexpected weight change.   HENT:  Negative for hearing loss, rhinorrhea and trouble swallowing.    Eyes:  Negative for discharge and visual disturbance.   Respiratory:  Negative for chest tightness and wheezing.    Cardiovascular:  Negative for chest pain and palpitations.   Gastrointestinal:  Negative for blood in stool, constipation, diarrhea and vomiting.   Endocrine: Negative for polydipsia and polyuria.   Genitourinary:  Negative for difficulty urinating, hematuria and urgency.   Musculoskeletal:  Negative for arthralgias, joint swelling and neck pain.   Neurological:  Negative for weakness and headaches.   Psychiatric/Behavioral:  Negative for confusion and dysphoric mood.        Physical Exam:   Physical Exam  Constitutional:       Appearance: Normal appearance. He is obese.   Cardiovascular:      Rate and Rhythm: Normal rate and regular rhythm.   Pulmonary:      Effort: Pulmonary effort is normal.      Breath sounds: Normal breath sounds.   Musculoskeletal:      Right knee: No bony tenderness. Normal range of motion.      Left knee: No bony tenderness. Normal range of motion.   Neurological:      Mental Status: He is alert.       Vitals:    06/27/24 1100   BP: 126/82   BP Location: Right arm   Patient Position: Sitting   BP Method: Large (Manual)   Pulse: 76   Temp: 98.5 °F (36.9  "°C)   TempSrc: Oral   SpO2: 96%   Weight: 101.5 kg (223 lb 10.5 oz)   Height: 5' 10.2" (1.783 m)      Body mass index is 31.91 kg/m².        History     Past Medical History:  History reviewed. No pertinent past medical history.    Past Surgical History:  Past Surgical History:   Procedure Laterality Date    wisdom tooth removal  2014       Social History:  Social History     Socioeconomic History    Marital status: Single    Number of children: 1   Occupational History    Occupation: [employer]   Tobacco Use    Smoking status: Never    Smokeless tobacco: Never   Substance and Sexual Activity    Alcohol use: Yes    Drug use: Yes     Types: Marijuana    Sexual activity: Yes     Partners: Female     Social Determinants of Health     Financial Resource Strain: Medium Risk (6/26/2024)    Overall Financial Resource Strain (CARDIA)     Difficulty of Paying Living Expenses: Somewhat hard   Food Insecurity: Food Insecurity Present (6/26/2024)    Hunger Vital Sign     Worried About Running Out of Food in the Last Year: Sometimes true     Ran Out of Food in the Last Year: Often true   Physical Activity: Unknown (6/26/2024)    Exercise Vital Sign     Days of Exercise per Week: 3 days   Stress: Stress Concern Present (6/26/2024)    [place] [place] [employer] - Occupational Stress Questionnaire     Feeling of Stress : Very much   Housing Stability: High Risk (6/26/2024)    Housing Stability Vital Sign     Unable to Pay for Housing in the Last Year: Yes       Family History:  Family History   Problem Relation Name Age of Onset    No Known Problems Mother      No Known Problems Father      No Known Problems Sister Chetna     No Known Problems Brother Kailash     Cancer Paternal Uncle      No Known Problems Son      Diabetes Neg Hx      Hypertension Neg Hx      Stroke Neg Hx         Allergies and Medications: (updated and reviewed)  Review of patient's allergies indicates:  No Known Allergies  No current outpatient " medications on file.     No current facility-administered medications for this visit.       Patient Care Team:  Silvia Park MD as PCP - General (Internal Medicine)         - The patient is given an After Visit Summary that lists all medications with directions, allergies, education, orders placed during this encounter and follow-up instructions.      - I have reviewed the patient's medical information including past medical, family, and social history sections including the medications and allergies.      - We discussed the patient's current medications.     This note was created by combination of typed  and MModal dictation.  Transcription errors may be present.  If there are any questions, please contact me.       Era Alatorre NP

## 2024-07-17 DIAGNOSIS — M25.561 RIGHT KNEE PAIN, UNSPECIFIED CHRONICITY: Primary | ICD-10-CM

## 2024-07-18 ENCOUNTER — HOSPITAL ENCOUNTER (OUTPATIENT)
Dept: RADIOLOGY | Facility: HOSPITAL | Age: 49
Discharge: HOME | End: 2024-07-18
Attending: NURSE PRACTITIONER
Payer: COMMERCIAL

## 2024-07-18 ENCOUNTER — OFFICE VISIT (OUTPATIENT)
Dept: ORTHOPEDICS | Facility: CLINIC | Age: 49
End: 2024-07-18
Payer: COMMERCIAL

## 2024-07-18 VITALS — HEIGHT: 70 IN | BODY MASS INDEX: 32.03 KG/M2 | WEIGHT: 223.75 LBS

## 2024-07-18 DIAGNOSIS — M17.11 PRIMARY OSTEOARTHRITIS OF RIGHT KNEE: Primary | ICD-10-CM

## 2024-07-18 DIAGNOSIS — G89.29 CHRONIC PAIN OF BOTH KNEES: ICD-10-CM

## 2024-07-18 DIAGNOSIS — M25.561 CHRONIC PAIN OF BOTH KNEES: ICD-10-CM

## 2024-07-18 DIAGNOSIS — M25.561 RIGHT KNEE PAIN, UNSPECIFIED CHRONICITY: ICD-10-CM

## 2024-07-18 DIAGNOSIS — M25.562 CHRONIC PAIN OF BOTH KNEES: ICD-10-CM

## 2024-07-18 PROCEDURE — 99213 OFFICE O/P EST LOW 20 MIN: CPT | Mod: 25,S$GLB,, | Performed by: NURSE PRACTITIONER

## 2024-07-18 PROCEDURE — 1159F MED LIST DOCD IN RCRD: CPT | Mod: CPTII,S$GLB,, | Performed by: NURSE PRACTITIONER

## 2024-07-18 PROCEDURE — 3008F BODY MASS INDEX DOCD: CPT | Mod: CPTII,S$GLB,, | Performed by: NURSE PRACTITIONER

## 2024-07-18 PROCEDURE — 73560 X-RAY EXAM OF KNEE 1 OR 2: CPT | Mod: 26,59,LT, | Performed by: RADIOLOGY

## 2024-07-18 PROCEDURE — 99999 PR PBB SHADOW E&M-EST. PATIENT-LVL III: CPT | Mod: PBBFAC,,, | Performed by: NURSE PRACTITIONER

## 2024-07-18 PROCEDURE — 1160F RVW MEDS BY RX/DR IN RCRD: CPT | Mod: CPTII,S$GLB,, | Performed by: NURSE PRACTITIONER

## 2024-07-18 PROCEDURE — 73562 X-RAY EXAM OF KNEE 3: CPT | Mod: TC,PO,RT

## 2024-07-18 PROCEDURE — 73562 X-RAY EXAM OF KNEE 3: CPT | Mod: 26,RT,, | Performed by: RADIOLOGY

## 2024-07-18 PROCEDURE — 3044F HG A1C LEVEL LT 7.0%: CPT | Mod: CPTII,S$GLB,, | Performed by: NURSE PRACTITIONER

## 2024-07-18 PROCEDURE — 20610 DRAIN/INJ JOINT/BURSA W/O US: CPT | Mod: RT,S$GLB,, | Performed by: NURSE PRACTITIONER

## 2024-07-18 RX ADMIN — TRIAMCINOLONE ACETONIDE 40 MG: 40 INJECTION, SUSPENSION INTRA-ARTICULAR; INTRAMUSCULAR at 03:07

## 2024-07-18 NOTE — PROGRESS NOTES
Chief Complaint   Patient presents with    Right Knee - Pain         HPI:   This is a 49 y.o. who presents to clinic today complaining of left knee pain for several months after no known trauma. Rates pain 8 out of 10; hurts to bend the knee or walk. Pain is progressively worsening. No numbness or tingling. No associated signs or symptoms.    History reviewed. No pertinent past medical history.  Past Surgical History:   Procedure Laterality Date    wisdom tooth removal  2014     No current outpatient medications on file prior to visit.     No current facility-administered medications on file prior to visit.     Review of patient's allergies indicates:  No Known Allergies  Family History   Problem Relation Name Age of Onset    No Known Problems Mother      No Known Problems Father      No Known Problems Sister Chetna     No Known Problems Brother Kailash     Cancer Paternal Uncle      No Known Problems Son      Diabetes Neg Hx      Hypertension Neg Hx      Stroke Neg Hx       Social History     Socioeconomic History    Marital status: Single    Number of children: 1   Occupational History    Occupation: [email or web address]   Tobacco Use    Smoking status: Never    Smokeless tobacco: Never   Substance and Sexual Activity    Alcohol use: Yes    Drug use: Yes     Types: Marijuana    Sexual activity: Yes     Partners: Female     Social Determinants of Health     Financial Resource Strain: Medium Risk (6/26/2024)    Overall Financial Resource Strain (CARDIA)     Difficulty of Paying Living Expenses: Somewhat hard   Food Insecurity: Food Insecurity Present (6/26/2024)    Hunger Vital Sign     Worried About Running Out of Food in the Last Year: Sometimes true     Ran Out of Food in the Last Year: Often true   Physical Activity: Unknown (6/26/2024)    Exercise Vital Sign     Days of Exercise per Week: 3 days   Stress: Stress Concern Present (6/26/2024)    [employer] [place] [employer] - Occupational Stress Questionnaire      Feeling of Stress : Very much   Housing Stability: High Risk (6/26/2024)    Housing Stability Vital Sign     Unable to Pay for Housing in the Last Year: Yes       Review of Systems:  Constitutional:  Denies fever or chills   Eyes:  Denies change in visual acuity   HENT:  Denies nasal congestion or sore throat   Respiratory:  Denies cough or shortness of breath   Cardiovascular:  Denies chest pain or edema   GI:  Denies abdominal pain, nausea, vomiting, bloody stools or diarrhea   :  Denies dysuria   Integument:  Denies rash   Neurologic:  Denies headache, focal weakness or sensory changes   Endocrine:  Denies polyuria or polydipsia   Lymphatic:  Denies swollen glands   Psychiatric:  Denies depression or anxiety     Physical Exam:   Constitutional:  Well developed, well nourished, no acute distress, non-toxic appearance   Integument:  Well hydrated, no rash   Lymphatic:  No lymphadenopathy noted   Neurologic:  Alert & oriented x 3, CN 2-12 normal, normal motor function, normal sensory function, no focal deficits noted   Psychiatric:  Speech and behavior appropriate   Eyes: EOMI  Gi: abdomen soft    Bilateral Knee Exam    right Knee Exam     Tenderness   The patient is experiencing tenderness in the medial joint line.    Range of Motion   Extension: abnormal   Flexion: abnormal     Muscle Strength     The patient has normal knee strength.    Tests   Pooja:  Medial - positive   Lachman:  Anterior - negative      Varus: negative  Valgus: negative  Patellar Apprehension: negative    Other   Erythema: absent  Sensation: normal  Pulse: present  Swelling: mild      left Knee Exam   left knee exam performed same as contralateral side and is normal.            X-rays were performed, personally reviewed by me and findings discussed with the patient.  3 views of the right knee show tricompartmental degenerative change most pronounced in the medial compartment with Kellgren 3 changes            Assessment & plan    Primary  osteoarthritis of right knee  -     Large Joint Aspiration/Injection: R knee  -     triamcinolone acetonide injection 40 mg    Chronic pain of both knees  -     Ambulatory referral/consult to Orthopedics          Using an aseptic technique, I injected 5 cc of lidocaine 1% without and 1 cc of kenalog 40mg into the right Knee. The patient tolerated this well. I will have them return to clinic as needed.

## 2024-07-21 RX ORDER — TRIAMCINOLONE ACETONIDE 40 MG/ML
40 INJECTION, SUSPENSION INTRA-ARTICULAR; INTRAMUSCULAR
Status: DISCONTINUED | OUTPATIENT
Start: 2024-07-18 | End: 2024-07-21 | Stop reason: HOSPADM

## 2024-09-19 ENCOUNTER — CLINICAL SUPPORT (OUTPATIENT)
Dept: ENDOSCOPY | Facility: HOSPITAL | Age: 49
End: 2024-09-19
Payer: COMMERCIAL

## 2024-09-19 ENCOUNTER — PATIENT MESSAGE (OUTPATIENT)
Dept: ENDOSCOPY | Facility: HOSPITAL | Age: 49
End: 2024-09-19

## 2024-09-19 ENCOUNTER — TELEPHONE (OUTPATIENT)
Dept: ENDOSCOPY | Facility: HOSPITAL | Age: 49
End: 2024-09-19

## 2024-09-19 DIAGNOSIS — Z12.11 COLON CANCER SCREENING: ICD-10-CM

## 2024-09-19 RX ORDER — SODIUM, POTASSIUM,MAG SULFATES 17.5-3.13G
SOLUTION, RECONSTITUTED, ORAL ORAL
Qty: 1 KIT | Refills: 0 | Status: SHIPPED | OUTPATIENT
Start: 2024-09-19

## 2024-09-20 NOTE — TELEPHONE ENCOUNTER
Spoke to patient to schedule procedure(s) Colonoscopy       Physician to perform procedure(s) Dr. MARGARITA Mon  Date of Procedure (s) 10/3/24  Arrival Time 8:00 AM  Time of Procedure(s) 9:00 AM   Location of Procedure(s) [place] [address] Floor  Type of Rx Prep sent to patient: Suprep  Instructions provided to patient via Email    Patient was informed on the following information and verbalized understanding. Screening questionnaire reviewed with patient and complete. If procedure requires anesthesia, a responsible adult needs to be present to accompany the patient home, patient cannot drive after receiving anesthesia. Appointment details are tentative, especially check-in time. Patient will receive a prep-op call 7 days prior to confirm check-in time for procedure. If applicable the patient should contact their pharmacy to verify Rx for procedure prep is ready for pick-up. Patient was advised to call the scheduling department at [phone] if pharmacy states no Rx is available. Patient was advised to call the endoscopy scheduling department if any questions or concerns arise.      [facility] Scheduling Department

## 2024-09-26 ENCOUNTER — TELEPHONE (OUTPATIENT)
Dept: ENDOSCOPY | Facility: HOSPITAL | Age: 49
End: 2024-09-26
Payer: COMMERCIAL

## 2024-09-26 NOTE — TELEPHONE ENCOUNTER
Left voicemail and sent portal message for patient to call Endoscopy Scheduling to review instructions and confirm appointment for Colonoscopy on 10/3/24.

## 2024-09-30 ENCOUNTER — TELEPHONE (OUTPATIENT)
Dept: ENDOSCOPY | Facility: HOSPITAL | Age: 49
End: 2024-09-30
Payer: COMMERCIAL

## 2024-09-30 NOTE — TELEPHONE ENCOUNTER
Left 2nd voicemail for patient to call Endoscopy Scheduling to review instructions and confirm appointment for Colonoscopy on 10/3/24.

## 2024-10-01 ENCOUNTER — TELEPHONE (OUTPATIENT)
Dept: ENDOSCOPY | Facility: HOSPITAL | Age: 49
End: 2024-10-01
Payer: COMMERCIAL

## 2024-10-01 ENCOUNTER — ANESTHESIA EVENT (OUTPATIENT)
Dept: ENDOSCOPY | Facility: HOSPITAL | Age: 49
End: 2024-10-01
Payer: COMMERCIAL

## 2024-10-01 NOTE — TELEPHONE ENCOUNTER
Received voicemail from patient regarding upcoming procedure.  Patient did not say if he wanted to confirm or reschedule. Left voicemail for patient to call Endoscopy Scheduling to review instructions and confirm appointment for Colonoscopy on 10/3/24.

## 2024-10-01 NOTE — ANESTHESIA PREPROCEDURE EVALUATION
10/01/2024  Marcial Downing is a 49 y.o., male.  Procedure: COLONOSCOPY (N/A)         Patient Active Problem List   Diagnosis    Overweight (BMI 25.0-29.9)    Carpal tunnel syndrome, bilateral       No past medical history on file.    ECHO: No results found for this or any previous visit.      There is no height or weight on file to calculate BMI.    Tobacco Use: Low Risk  (7/18/2024)    Patient History     Smoking Tobacco Use: Never     Smokeless Tobacco Use: Never     Passive Exposure: Not on file       Social History     Substance and Sexual Activity   Drug Use Yes    Types: Marijuana        Alcohol Use: Alcohol Misuse (6/26/2024)    AUDIT-C     Frequency of Alcohol Consumption: 2-3 times a week     Average Number of Drinks: 3 or 4     Frequency of Binge Drinking: Weekly       Review of patient's allergies indicates:  No Known Allergies      Airway:  No value filed.      Pre-op Assessment    I have reviewed the Patient Summary Reports.     I have reviewed the Nursing Notes. I have reviewed the NPO Status.   I have reviewed the Medications.     Review of Systems  Anesthesia Hx:  No problems with previous Anesthesia             Denies Family Hx of Anesthesia complications.    Denies Personal Hx of Anesthesia complications.                    Hematology/Oncology:  Hematology Normal   Oncology Normal                                   EENT/Dental:  EENT/Dental Normal           Cardiovascular:  Cardiovascular Normal Exercise tolerance: good                 ECG has been reviewed.                          Pulmonary:  Pulmonary Normal                       Renal/:  Renal/ Normal                 Hepatic/GI:  Hepatic/GI Normal                 Musculoskeletal:  Musculoskeletal Normal                Neurological:  Neurology Normal                                      Endocrine:  Endocrine Normal             Dermatological:  Skin Normal    Psych:  Psychiatric Normal                  Physical Exam  General: Well nourished    Airway:  Mallampati: II   Mouth Opening: Normal  TM Distance: Normal  Tongue: Normal  Neck ROM: Normal ROM    Dental:  Intact        Anesthesia Plan  Type of Anesthesia, risks & benefits discussed:    Anesthesia Type: Gen Natural Airway  Intra-op Monitoring Plan: Standard ASA Monitors  Post Op Pain Control Plan: multimodal analgesia and IV/PO Opioids PRN  Induction:  IV  Informed Consent: Informed consent signed with the Patient and all parties understand the risks and agree with anesthesia plan.  All questions answered. Patient consented to blood products? No  ASA Score: 1  Day of Surgery Review of History & Physical: H&P Update referred to the surgeon/provider.    Ready For Surgery From Anesthesia Perspective.     .

## 2024-10-01 NOTE — TELEPHONE ENCOUNTER
Spoke to patient for pre-call to confirm scheduled Colonoscopy and patient verbalized understanding of the following:       Date & arrival time of procedure(s) verified 10/3/24, 8:15 AM.  Location of procedure(s) [facility] [address] Floor verified.  NPO status reinforced. Ok to continue clear liquids until 5:15 AM.   Patient denies use of blood thinners, GLP-1 medications, and weight loss medications.  Patient confirmed receipt of prep instructions and Rx prep.  Instructions provided to patient via [facility].  Patient confirmed ride home after procedure if procedure requires anesthesia.   Pre-call screening questionnaire reviewed and completed with patient.   Appointment details are tentative, including check-in time.  If the patient begins taking any blood thinning medications, injectable weight loss/diabetes medications (other than insulin), or Adipex (phentermine) patient was instructed to contact the endoscopy scheduling department as soon as possible.  Patient was advised to call the endoscopy scheduling department if any questions or concerns arise.      Endoscopy Scheduling Department

## 2024-10-03 ENCOUNTER — TELEPHONE (OUTPATIENT)
Dept: GASTROENTEROLOGY | Facility: CLINIC | Age: 49
End: 2024-10-03
Payer: COMMERCIAL

## 2024-10-03 ENCOUNTER — ANESTHESIA (OUTPATIENT)
Dept: ENDOSCOPY | Facility: HOSPITAL | Age: 49
End: 2024-10-03
Payer: COMMERCIAL

## 2024-10-03 ENCOUNTER — TELEPHONE (OUTPATIENT)
Dept: ENDOSCOPY | Facility: HOSPITAL | Age: 49
End: 2024-10-03
Payer: COMMERCIAL

## 2024-10-03 ENCOUNTER — HOSPITAL ENCOUNTER (OUTPATIENT)
Facility: HOSPITAL | Age: 49
Discharge: HOME | End: 2024-10-03
Attending: STUDENT IN AN ORGANIZED HEALTH CARE EDUCATION/TRAINING PROGRAM | Admitting: STUDENT IN AN ORGANIZED HEALTH CARE EDUCATION/TRAINING PROGRAM
Payer: COMMERCIAL

## 2024-10-03 VITALS
SYSTOLIC BLOOD PRESSURE: 154 MMHG | WEIGHT: 225 LBS | RESPIRATION RATE: 17 BRPM | HEART RATE: 68 BPM | TEMPERATURE: 98 F | OXYGEN SATURATION: 100 % | BODY MASS INDEX: 33.33 KG/M2 | DIASTOLIC BLOOD PRESSURE: 87 MMHG | HEIGHT: 69 IN

## 2024-10-03 DIAGNOSIS — Z12.11 COLON CANCER SCREENING: Primary | ICD-10-CM

## 2024-10-03 PROCEDURE — 25000003 PHARM REV CODE 250: Performed by: NURSE ANESTHETIST, CERTIFIED REGISTERED

## 2024-10-03 PROCEDURE — G0121 COLON CA SCRN NOT HI RSK IND: HCPCS | Mod: 74 | Performed by: STUDENT IN AN ORGANIZED HEALTH CARE EDUCATION/TRAINING PROGRAM

## 2024-10-03 PROCEDURE — 37000009 HC ANESTHESIA EA ADD 15 MINS: Performed by: STUDENT IN AN ORGANIZED HEALTH CARE EDUCATION/TRAINING PROGRAM

## 2024-10-03 PROCEDURE — 37000008 HC ANESTHESIA 1ST 15 MINUTES: Performed by: STUDENT IN AN ORGANIZED HEALTH CARE EDUCATION/TRAINING PROGRAM

## 2024-10-03 PROCEDURE — 99900035 HC TECH TIME PER 15 MIN (STAT)

## 2024-10-03 PROCEDURE — 94761 N-INVAS EAR/PLS OXIMETRY MLT: CPT

## 2024-10-03 PROCEDURE — 63600175 PHARM REV CODE 636 W HCPCS: Performed by: NURSE ANESTHETIST, CERTIFIED REGISTERED

## 2024-10-03 PROCEDURE — G0121 COLON CA SCRN NOT HI RSK IND: HCPCS | Mod: 53,,, | Performed by: STUDENT IN AN ORGANIZED HEALTH CARE EDUCATION/TRAINING PROGRAM

## 2024-10-03 RX ORDER — SODIUM CHLORIDE 9 MG/ML
INJECTION, SOLUTION INTRAVENOUS CONTINUOUS
Status: DISCONTINUED | OUTPATIENT
Start: 2024-10-03 | End: 2024-10-03 | Stop reason: HOSPADM

## 2024-10-03 RX ORDER — LIDOCAINE HYDROCHLORIDE 20 MG/ML
INJECTION INTRAVENOUS
Status: DISCONTINUED | OUTPATIENT
Start: 2024-10-03 | End: 2024-10-03

## 2024-10-03 RX ORDER — PROPOFOL 10 MG/ML
VIAL (ML) INTRAVENOUS
Status: DISCONTINUED | OUTPATIENT
Start: 2024-10-03 | End: 2024-10-03

## 2024-10-03 RX ORDER — PROPOFOL 10 MG/ML
VIAL (ML) INTRAVENOUS CONTINUOUS PRN
Status: DISCONTINUED | OUTPATIENT
Start: 2024-10-03 | End: 2024-10-03

## 2024-10-03 RX ADMIN — LIDOCAINE HYDROCHLORIDE 100 MG: 20 INJECTION INTRAVENOUS at 09:10

## 2024-10-03 RX ADMIN — SODIUM CHLORIDE: 0.9 INJECTION, SOLUTION INTRAVENOUS at 09:10

## 2024-10-03 RX ADMIN — PROPOFOL 175 MCG/KG/MIN: 10 INJECTION, EMULSION INTRAVENOUS at 09:10

## 2024-10-03 RX ADMIN — PROPOFOL 100 MG: 10 INJECTION, EMULSION INTRAVENOUS at 09:10

## 2024-10-03 NOTE — TRANSFER OF CARE
"Anesthesia Transfer of Care Note    Patient: Marcial Downing    Procedure(s) Performed: Procedure(s) (LRB):  COLONOSCOPY, SCREENING, LOW RISK PATIENT (N/A)    Patient location: PACU    Anesthesia Type: general    Transport from OR: Transported from OR on room air with adequate spontaneous ventilation    Post pain: adequate analgesia    Post assessment: no apparent anesthetic complications and tolerated procedure well    Post vital signs: stable    Level of consciousness: alert, awake and oriented    Nausea/Vomiting: no nausea/vomiting    Complications: none    Transfer of care protocol was followed      Last vitals: Visit Vitals  /89 (BP Location: Left arm, Patient Position: Lying)   Pulse 70   Temp 36.7 °C (98.1 °F) (Temporal)   Resp 17   Ht 5' 9" (1.753 m)   Wt 102.1 kg (225 lb)   SpO2 99%   BMI 33.23 kg/m²     "

## 2024-10-03 NOTE — PLAN OF CARE
Pt ate yesterday, causing poor visualization of colon. Pt has been educated on proper colonoscopy prep. Pt will be rescheduled for tomorrow morning. Pt verbalized understanding. Aaox4. Vss. Passed PO challenge. Pt will be discharged home safely with family

## 2024-10-03 NOTE — TELEPHONE ENCOUNTER
Miralax Instructions for Colonoscopy      Date of procedure: 10/4/2024Arrive at: 7:00 AM    Location of Department: [address] [place] [address], [place] [address]  Take the Elevators to 2nd Floor Endoscopy Procedural Area      As soon as possible:   your over the counter prep (MIRALAX) and DULCOLAX LAXATIVE TABLETS     On the day before your procedure   What You CAN do:   You may have clear liquids ONLY-see below for list.    Liquids That Are OK to Drink:   Water  Sports drinks (Gatorade, Power-Aid)  Coffee or tea (no cream or nondairy creamer)  Clear juices without pulp (apple, white grape)  Gelatin desserts (no fruit or toppings)  Clear soda (sprite, coke, ginger ale)  Chicken broth (until 12 midnight the night before procedure)      What You CANNOT do:   Do not EAT solid food, drink milk or anything colored red.  Do not drink alcohol.  Do not take oral medications within 1 hour of starting   each dose of MIRALAX.  No gum chewing or candy morning of procedure.    Note:   (Please disregard the MIRALAX bottle instructions).    It is not uncommon to experience some abdominal cramping, nausea and/or vomiting when taking the prep. If you have nausea and/or vomiting while taking the prep, stop drinking for 20 to 30 minutes then resume.    IMPORTANT: If you experience preparation-related symptoms (for example, nausea, bloating, or cramping), pause, or slow the rate of drinking the additional water until your symptoms decrease.    How to take prep:    MIRALAX Bowel Prep-One (1) bottle-8.3 oz. (238 grams).     You must drink water with each dose, and additional water after each dose.      DOSE 1--Day Before Procedure  10/3/2024     Drink at least 6 to 8 glasses of clear liquids from time you wake up until you begin your prep and then continue until bedtime to avoid dehydration.       Step 1- 12:00 pm (NOON) - Mix the 8.3 oz. bottle of Miralax (238 grams) and 64 oz. of Gatorade/Power-Aid, place in the  refrigerator (do not add ice). Take four (4) Dulcolax (Bisacodyl) tablets with at least 8 oz. or more of clear liquids.    Step 2- 6:00 pm- Drink one 8 oz. glass of the Miralax/Gatorade prep every 15 minutes until half the mixture (32 oz./quart) is gone. Set a timer as a reminder. Refrigerate the remaining half of prep for dose 2. See below when to begin this step.     Drink additional water/clear liquids    DOSE 2--Day of the Procedure  10/4/2024    Step 1- 2-3 AM-Drink the 2nd half of the prep.   Step 2- You may continue drinking water/clear liquids until   4 hours before your colonoscopy or as directed by the scheduling nurse 4:00 AM.    For information about your procedure, two (2) things to view prior to colonoscopy:  Please watch this informational video. It is important to watch this animated consent video prior to your arrival. If you haven't watched the video prior to arriving, you are required to watch it during admission which can causes delays.    Options for viewing:   Using a keyboard:  press and hold the control tab (Ctrl) and left mouse click to follow links.           Colonoscopy Instructional Video                                                                                   OR    Type link address into your web browser's address bar:  [email or web address]      Educational Booklet with pictures:      Colonoscopy Prep - Liquid    Comments:

## 2024-10-03 NOTE — TELEPHONE ENCOUNTER
----- Message from Summer sent at 10/3/2024  1:35 PM CDT -----  .Type:  Patient Call Back    Who Called: PT       Does the patient know what this is regarding?: PLEASE REACH OUT TO PT ON RE/S THE COLONOSCOPY FOR A MONDAY     Would the patient rather a call back YES     Best Call Back Number:  [phone]    Additional Information: Thank You

## 2024-10-03 NOTE — TELEPHONE ENCOUNTER
Pt came for colonoscopy on 10.3; poor prep; gave pt instructions for miralax and rescheduled for 10.4; pt verbalized understanding

## 2024-10-03 NOTE — ANESTHESIA POSTPROCEDURE EVALUATION
Anesthesia Post Evaluation    Patient: Marcial Downing    Procedure(s) Performed: Procedure(s) (LRB):  COLONOSCOPY, SCREENING, LOW RISK PATIENT (N/A)    Final Anesthesia Type: general      Patient location during evaluation: PACU  Patient participation: Yes- Able to Participate  Level of consciousness: awake and alert  Post-procedure vital signs: reviewed and stable  Pain management: adequate  Airway patency: patent    PONV status at discharge: No PONV  Anesthetic complications: no      Cardiovascular status: blood pressure returned to baseline  Respiratory status: unassisted  Hydration status: euvolemic            Vitals Value Taken Time   /87 10/03/24 0944   Temp 36.7 °C (98.1 °F) 10/03/24 0929   Pulse 68 10/03/24 0951   Resp 17 10/03/24 0951   SpO2 100 % 10/03/24 0951         Event Time   Out of Recovery 09:44:00         Pain/Prudence Score: Prudence Score: 10 (10/3/2024  9:44 AM)

## 2024-10-03 NOTE — PROVATION PATIENT INSTRUCTIONS
Discharge Summary/Instructions after an Endoscopic Procedure  Patient Name: Marcial Downing  Patient MRN: [id number]  Patient YOB: 1975  Thursday, October 3, 2024  Fortino Mon MD  Dear patient,  As a result of recent federal legislation (The Federal Cures Act), you may   receive lab or pathology results from your procedure in your [facility]   account before your physician is able to contact you. Your physician or   their representative will relay the results to you with their   recommendations at their soonest availability.  Thank you,  RESTRICTIONS:  During your procedure today, you received medications for sedation.  These   medications may affect your judgment, balance and coordination.  Therefore,   for 24 hours, you have the following restrictions:   - DO NOT drive a car, operate machinery, make legal/financial decisions,   sign important papers or drink alcohol.    ACTIVITY:  Today: no heavy lifting, straining or running due to procedural   sedation/anesthesia.  The following day: return to full activity including work.  DIET:  Eat and drink normally unless instructed otherwise.     TREATMENT FOR COMMON SIDE EFFECTS:  - Mild abdominal pain, nausea, belching, bloating or excessive gas:  rest,   eat lightly and use a heating pad.  - Sore Throat: treat with throat lozenges and/or gargle with warm salt   water.  - Because air was used during the procedure, expelling large amounts of air   from your rectum or belching is normal.  - If a bowel prep was taken, you may not have a bowel movement for 1-3 days.    This is normal.  SYMPTOMS TO WATCH FOR AND REPORT TO YOUR PHYSICIAN:  1. Abdominal pain or bloating, other than gas cramps.  2. Chest pain.  3. Back pain.  4. Signs of infection such as: chills or fever occurring within 24 hours   after the procedure.  5. Rectal bleeding, which would show as bright red, maroon, or black stools.   (A tablespoon of blood from the rectum is not serious,  especially if   hemorrhoids are present.)  6. Vomiting.  7. Weakness or dizziness.  GO DIRECTLY TO THE NEAREST EMERGENCY ROOM IF YOU HAVE ANY OF THE FOLLOWING:      Difficulty breathing              Chills and/or fever over 101 F   Persistent vomiting and/or vomiting blood   Severe abdominal pain   Severe chest pain   Black, tarry stools   Bleeding- more than one tablespoon   Any other symptom or condition that you feel may need urgent attention  Your doctor recommends these additional instructions:  If any biopsies were taken, your doctors clinic will contact you in 1 to 2   weeks with any results.  - Discharge patient to home (ambulatory).   - Patient has a contact number available for emergencies.  The signs and   symptoms of potential delayed complications were discussed with the   patient.  Return to normal activities tomorrow.  Written discharge   instructions were provided to the patient.   - Resume previous diet.   - Continue present medications.   - Return to primary care physician as previously scheduled.   - Repeat colonoscopy because the bowel preparation was poor. Ensure patient   adheres to dietary and prep instructions.  For questions, problems or results please call your physician - Fortino Mon MD at Work:  [phone].  [facility], EMERGENCY ROOM PHONE NUMBER: [phone]  IF A COMPLICATION OR EMERGENCY SITUATION ARISES AND YOU ARE UNABLE TO REACH   YOUR PHYSICIAN - GO DIRECTLY TO THE EMERGENCY ROOM.  Fortino Mon MD  10/3/2024 9:32:48 AM  This report has been verified and signed electronically.  Dear patient,  As a result of recent federal legislation (The Federal Cures Act), you may   receive lab or pathology results from your procedure in your [facility]   account before your physician is able to contact you. Your physician or   their representative will relay the results to you with their   recommendations at their soonest availability.  Thank you,  PROVATION

## 2024-10-03 NOTE — H&P
Short Stay Endoscopy History and Physical    PCP - Silvia Park MD  Referring Physician - PRE-ADMIT NURSE 1, ENDO -[facility]  No address on file    Procedure - Colonoscopy  ASA - per anesthesia  Mallampati - per anesthesia  History of Anesthesia problems - no  Family history Anesthesia problems -  no   Plan of anesthesia - General    HPI  49 y.o. male  Reason for procedure:   Colon cancer screening [Z12.11]        ROS:  Constitutional: No fevers, chills, No weight loss  CV: No chest pain  Pulm: No cough, No shortness of breath  GI: see HPI    Medical History:  has no past medical history on file.    Surgical History:  has a past surgical history that includes wisdom tooth removal (2014).    Family History: family history includes Cancer in his paternal uncle; No Known Problems in his brother, father, mother, sister, and son..    Social History:  reports that he has never smoked. He has never used smokeless tobacco. He reports current alcohol use. He reports current drug use. Drug: Marijuana.    Review of patient's allergies indicates:  No Known Allergies    Medications:   Medications Prior to Admission   Medication Sig Dispense Refill Last Dose/Taking    sodium,potassium,mag sulfates (SUPREP BOWEL PREP KIT) 17.5-3.13-1.6 gram SolR Follow instructions given by Endoscopy scheduling nurse 1 kit 0        Physical Exam:    Vital Signs:   Vitals:    10/03/24 0850   BP: 125/87   Pulse: 62   Resp: 16   Temp: 98.8 °F (37.1 °C)       General Appearance: Well appearing in no acute distress  Abdomen: Soft, non tender, non distended with normal bowel sounds, no masses    Labs:  Lab Results   Component Value Date    WBC 5.34 06/27/2024    HGB 15.0 06/27/2024    HCT 46.0 06/27/2024     06/27/2024    CHOL 195 06/27/2024    TRIG 257 (H) 06/27/2024    HDL 34 (L) 06/27/2024    ALT 21 06/27/2024    AST 20 06/27/2024     06/27/2024    K 4.0 06/27/2024     06/27/2024    CREATININE 1.2 06/27/2024     BUN 11 06/27/2024    CO2 25 06/27/2024    TSH 0.952 06/27/2024    HGBA1C 5.9 (H) 06/27/2024       I have explained the risks and benefits of this endoscopic procedure to the patient including but not limited to bleeding, inflammation, infection, perforation, and death.      Fortino Mon MD

## 2024-10-03 NOTE — TELEPHONE ENCOUNTER
Spoke to patient to schedule procedure(s) Colonoscopy       Physician to perform procedure(s) Dr. FREDDY Nguyen  Date of Procedure (s) 10/4/2024  Arrival Time 7:00 AM  Time of Procedure(s) 8:00 AM   Location of Procedure(s) [facility] 2nd Floor  Type of Rx Prep sent to patient: Miralax  Instructions provided to patient via printed copy    Patient was informed on the following information and verbalized understanding. Screening questionnaire reviewed with patient and complete. If procedure requires anesthesia, a responsible adult needs to be present to accompany the patient home, patient cannot drive after receiving anesthesia. Appointment details are tentative, especially check-in time. Patient will receive a prep-op call 7 days prior to confirm check-in time for procedure. If applicable the patient should contact their pharmacy to verify Rx for procedure prep is ready for pick-up. Patient was advised to call the scheduling department at [phone] if pharmacy states no Rx is available. Patient was advised to call the endoscopy scheduling department if any questions or concerns arise.      [facility] Endoscopy Scheduling Department

## 2024-10-03 NOTE — TELEPHONE ENCOUNTER
"    Endo Case Request  Received: Today  Fortino Mon MD Piglia, Ashley, RN  Procedure: Colonoscopy    Diagnosis: Screening colonoscopy    Procedure Timin-12 weeks    *If within 4 weeks selected, please phillip as high priority*    *If greater than 12 weeks, please select "5-12 weeks" and delay sending until 3 months prior to requested date*    Location: Any Site    Additional Scheduling Information: No scheduling concerns    Prep Specifications:Standard prep    Is the patient taking a GLP-1 Agonist:no    Have you attached a patient to this message: yes  "

## 2024-10-04 ENCOUNTER — TELEPHONE (OUTPATIENT)
Dept: ENDOSCOPY | Facility: HOSPITAL | Age: 49
End: 2024-10-04
Payer: COMMERCIAL

## 2024-10-04 ENCOUNTER — ANESTHESIA (OUTPATIENT)
Dept: ENDOSCOPY | Facility: HOSPITAL | Age: 49
End: 2024-10-04
Payer: COMMERCIAL

## 2024-10-04 ENCOUNTER — PATIENT MESSAGE (OUTPATIENT)
Dept: ENDOSCOPY | Facility: HOSPITAL | Age: 49
End: 2024-10-04
Payer: COMMERCIAL

## 2024-10-04 ENCOUNTER — ANESTHESIA EVENT (OUTPATIENT)
Dept: ENDOSCOPY | Facility: HOSPITAL | Age: 49
End: 2024-10-04
Payer: COMMERCIAL

## 2024-10-04 NOTE — TELEPHONE ENCOUNTER
Poor prep 10.3; pt to continue prep and come back 10.4; pt called stating he prefers Monday appt. Rescheduled for 10.21; ext miralax prep sent via portal

## 2024-10-14 ENCOUNTER — TELEPHONE (OUTPATIENT)
Dept: ENDOSCOPY | Facility: HOSPITAL | Age: 49
End: 2024-10-14
Payer: COMMERCIAL

## 2024-10-14 NOTE — TELEPHONE ENCOUNTER
Spoke to patient for pre-call to confirm scheduled Colonoscopy and patient verbalized understanding of the following:       Date & arrival time of procedure(s) verified 10/21/24, 9:15 AM.  Location of procedure(s) [facility] 2nd Floor verified.  NPO status reinforced. Ok to continue clear liquids until 8:15 AM.   Patient denies use of blood thinners, GLP-1 medications, and weight loss medications.  Patient confirmed receipt of prep instructions and OTC prep.  Instructions provided to patient via [facility].  Patient confirmed ride home after procedure if procedure requires anesthesia.   Pre-call screening questionnaire reviewed and completed with patient.   Appointment details are tentative, including check-in time.  If the patient begins taking any blood thinning medications, injectable weight loss/diabetes medications (other than insulin), or Adipex (phentermine) patient was instructed to contact the endoscopy scheduling department as soon as possible.  Patient was advised to call the endoscopy scheduling department if any questions or concerns arise.     [facility] Endoscopy Scheduling Department

## 2024-10-15 NOTE — ANESTHESIA PREPROCEDURE EVALUATION
10/15/2024  Marcial Downing is a 49 y.o., male.  [facility]  Anesthesia Pre-Operative Evaluation       Patient Name: Marcial Downing  YOB: 1975  MRN: [id number]  CSN: [id number]      Code Status: No Order   Date of Procedure: 10/21/2024  Anesthesia: Choice Procedure: Procedure(s) (LRB):  COLONOSCOPY, SCREENING, LOW RISK PATIENT (N/A)  Pre-Operative Diagnosis: Colon cancer screening [Z12.11]  Proceduralist: Surgeons and Role:     * Toney Nguyen MD - Primary        SUBJECTIVE:   Marcial Downing is a 49 y.o. male who  has no past medical history on file..     he is not on any long-term medications.     ALLERGIES:   Review of patient's allergies indicates:  No Known Allergies  LDA:          Lines/Drains/Airways       None                  Anesthesia Evaluation      Airway   Mallampati: II  TM distance: Normal  Dental      Pulmonary    Cardiovascular     Rate: Normal    Neuro/Psych    (+) neuromuscular disease    GI/Hepatic/Renal      Endo/Other    Abdominal                   MEDICATIONS:     Antibiotics (From admission, onward)      None          VTE Risk Mitigation (From admission, onward)      None              No current facility-administered medications for this encounter.     Current Outpatient Medications   Medication Sig Dispense Refill    sodium,potassium,mag sulfates (SUPREP BOWEL PREP KIT) 17.5-3.13-1.6 gram SolR Follow instructions given by Endoscopy scheduling nurse 1 kit 0          History:   There are no hospital problems to display for this patient.    Surgical History:    has a past surgical history that includes wisdom tooth removal (2014) and colonoscopy, screening, low risk patient (N/A, 10/3/2024).   Social History:    reports being sexually active and has had partner(s) who are female.  reports that he has never smoked. He has never used smokeless tobacco. He  "reports current alcohol use. He reports current drug use. Drug: Marijuana.     OBJECTIVE:     Vital Signs (Most Recent):    Vital Signs Range (Last 24H):          There is no height or weight on file to calculate BMI.   Wt Readings from Last 4 Encounters:   10/03/24 102.1 kg (225 lb)   07/18/24 101.5 kg (223 lb 12.3 oz)   06/27/24 101.5 kg (223 lb 10.5 oz)   09/19/22 93.3 kg (205 lb 11 oz)       Significant Labs:  Lab Results   Component Value Date    WBC 5.34 06/27/2024    HGB 15.0 06/27/2024    HCT 46.0 06/27/2024     06/27/2024     06/27/2024    K 4.0 06/27/2024     06/27/2024    CREATININE 1.2 06/27/2024    BUN 11 06/27/2024    CO2 25 06/27/2024    GLU 94 06/27/2024    CALCIUM 9.2 06/27/2024    ALKPHOS 60 06/27/2024    ALT 21 06/27/2024    AST 20 06/27/2024    ALBUMIN 4.0 06/27/2024    HGBA1C 5.9 (H) 06/27/2024     No LMP for male patient.  No results found for this or any previous visit (from the past 72 hours).    EKG:   No results found for this or any previous visit.    TTE:  No results found for this or any previous visit.  No results found for: "EF"   No results found for this or any previous visit.  BRIDGETTE:  No results found for this or any previous visit.  Stress Test:  No results found for this or any previous visit.     LHC:  No results found for this or any previous visit.     PFT:  No results found for: "FEV1", "FVC", "UCY5IBM", "TLC", "DLCO"     ASSESSMENT/PLAN:         Pre-op Assessment    I have reviewed the Patient Summary Reports.     I have reviewed the Nursing Notes. I have reviewed the NPO Status.   I have reviewed the Medications.     Review of Systems  Anesthesia Hx:  No previous Anesthesia             Denies Family Hx of Anesthesia complications.    Denies Personal Hx of Anesthesia complications.                    Social:  Alcohol Use, Non-Smoker, Recreational Drugs Marijuana.      Hematology/Oncology:  Hematology Normal   Oncology Normal                                 "   EENT/Dental:  EENT/Dental Normal           Cardiovascular:  Cardiovascular Normal                                              Pulmonary:  Pulmonary Normal                       Renal/:  Renal/ Normal                 Hepatic/GI:  Hepatic/GI Normal                    Musculoskeletal:  Musculoskeletal Normal                Neurological:    Neuromuscular Disease,                                   Endocrine:  Endocrine Normal          Obesity / BMI > 30  Dermatological:  Skin Normal    Psych:  Psychiatric Normal                  Physical Exam  General: Well nourished    Airway:  Mallampati: II   Mouth Opening: Normal  TM Distance: Normal    Chest/Lungs:  Normal Respiratory Rate    Heart:  Rate: Normal    Anesthesia Plan  Type of Anesthesia, risks & benefits discussed:    Anesthesia Type: Gen Natural Airway  Intra-op Monitoring Plan: Standard ASA Monitors  Induction:  IV  Informed Consent: Informed consent signed with the Patient and all parties understand the risks and agree with anesthesia plan.  All questions answered. Patient consented to blood products? No  ASA Score: 2  Day of Surgery Review of History & Physical: H&P Update referred to the surgeon/provider.    Ready For Surgery From Anesthesia Perspective.     .

## 2024-10-21 ENCOUNTER — PATIENT MESSAGE (OUTPATIENT)
Dept: ENDOSCOPY | Facility: HOSPITAL | Age: 49
End: 2024-10-21
Payer: COMMERCIAL

## 2024-11-05 ENCOUNTER — PATIENT MESSAGE (OUTPATIENT)
Dept: RESEARCH | Facility: HOSPITAL | Age: 49
End: 2024-11-05
Payer: COMMERCIAL

## 2024-11-27 NOTE — H&P
Short Stay Endoscopy History and Physical    PCP - Silvia Park MD  Referring Physician - PRE-ADMIT, ENDO -[facility]  No address on file    Procedure - Colonoscopy  ASA - per anesthesia  Mallampati - per anesthesia  History of Anesthesia problems - no  Family history Anesthesia problems -  no   Plan of anesthesia - General    HPI  49 y.o. male  Reason for procedure:   Colon cancer screening [Z12.11]     Poor prep 10/2024     ROS:  Constitutional: No fevers, chills, No weight loss  CV: No chest pain  Pulm: No cough, No shortness of breath  GI: see HPI    Medical History:  has no past medical history on file.    Surgical History:  has a past surgical history that includes wisdom tooth removal (2014) and colonoscopy, screening, low risk patient (N/A, 10/3/2024).    Family History: family history includes Cancer in his paternal uncle; No Known Problems in his brother, father, mother, sister, and son..    Social History:  reports that he has never smoked. He has never used smokeless tobacco. He reports current alcohol use. He reports current drug use. Drug: Marijuana.    Review of patient's allergies indicates:  No Known Allergies    Medications:   Medications Prior to Admission   Medication Sig Dispense Refill Last Dose/Taking    sodium,potassium,mag sulfates (SUPREP BOWEL PREP KIT) 17.5-3.13-1.6 gram SolR Follow instructions given by Endoscopy scheduling nurse 1 kit 0        Physical Exam:    Vital Signs: There were no vitals filed for this visit.    General Appearance: Well appearing in no acute distress  Abdomen: Soft, non tender, non distended with normal bowel sounds, no masses    Labs:  Lab Results   Component Value Date    WBC 5.34 06/27/2024    HGB 15.0 06/27/2024    HCT 46.0 06/27/2024     06/27/2024    CHOL 195 06/27/2024    TRIG 257 (H) 06/27/2024    HDL 34 (L) 06/27/2024    ALT 21 06/27/2024    AST 20 06/27/2024     06/27/2024    K 4.0 06/27/2024     06/27/2024     CREATININE 1.2 06/27/2024    BUN 11 06/27/2024    CO2 25 06/27/2024    TSH 0.952 06/27/2024    HGBA1C 5.9 (H) 06/27/2024       I have explained the risks and benefits of this endoscopic procedure to the patient including but not limited to bleeding, inflammation, infection, perforation, and death.    Assessment/Plan:     CRC Screening     - Proceed with colonoscopy     Trinh Najera MD  Gastroenterology   [facility]

## 2024-12-02 ENCOUNTER — HOSPITAL ENCOUNTER (OUTPATIENT)
Facility: HOSPITAL | Age: 49
Discharge: HOME | End: 2024-12-02
Attending: STUDENT IN AN ORGANIZED HEALTH CARE EDUCATION/TRAINING PROGRAM | Admitting: STUDENT IN AN ORGANIZED HEALTH CARE EDUCATION/TRAINING PROGRAM
Payer: COMMERCIAL

## 2024-12-02 VITALS
HEART RATE: 78 BPM | SYSTOLIC BLOOD PRESSURE: 124 MMHG | RESPIRATION RATE: 18 BRPM | OXYGEN SATURATION: 100 % | DIASTOLIC BLOOD PRESSURE: 91 MMHG | TEMPERATURE: 98 F

## 2024-12-02 DIAGNOSIS — Z12.11 COLON CANCER SCREENING: Primary | ICD-10-CM

## 2024-12-02 PROBLEM — R73.03 PREDIABETES: Status: ACTIVE | Noted: 2024-12-02

## 2024-12-02 PROCEDURE — A4216 STERILE WATER/SALINE, 10 ML: HCPCS | Performed by: NURSE ANESTHETIST, CERTIFIED REGISTERED

## 2024-12-02 PROCEDURE — 37000008 HC ANESTHESIA 1ST 15 MINUTES: Performed by: STUDENT IN AN ORGANIZED HEALTH CARE EDUCATION/TRAINING PROGRAM

## 2024-12-02 PROCEDURE — G0121 COLON CA SCRN NOT HI RSK IND: HCPCS | Mod: ,,, | Performed by: STUDENT IN AN ORGANIZED HEALTH CARE EDUCATION/TRAINING PROGRAM

## 2024-12-02 PROCEDURE — 63600175 PHARM REV CODE 636 W HCPCS: Performed by: NURSE ANESTHETIST, CERTIFIED REGISTERED

## 2024-12-02 PROCEDURE — D9220A PRA ANESTHESIA: Mod: ,,, | Performed by: NURSE ANESTHETIST, CERTIFIED REGISTERED

## 2024-12-02 PROCEDURE — 94761 N-INVAS EAR/PLS OXIMETRY MLT: CPT

## 2024-12-02 PROCEDURE — 37000009 HC ANESTHESIA EA ADD 15 MINS: Performed by: STUDENT IN AN ORGANIZED HEALTH CARE EDUCATION/TRAINING PROGRAM

## 2024-12-02 PROCEDURE — G0121 COLON CA SCRN NOT HI RSK IND: HCPCS | Performed by: STUDENT IN AN ORGANIZED HEALTH CARE EDUCATION/TRAINING PROGRAM

## 2024-12-02 PROCEDURE — 99900035 HC TECH TIME PER 15 MIN (STAT)

## 2024-12-02 PROCEDURE — 25000003 PHARM REV CODE 250: Performed by: NURSE ANESTHETIST, CERTIFIED REGISTERED

## 2024-12-02 RX ORDER — SODIUM CHLORIDE 0.9 % (FLUSH) 0.9 %
SYRINGE (ML) INJECTION
Status: DISCONTINUED | OUTPATIENT
Start: 2024-12-02 | End: 2024-12-02

## 2024-12-02 RX ORDER — LIDOCAINE HYDROCHLORIDE 20 MG/ML
INJECTION INTRAVENOUS
Status: DISCONTINUED | OUTPATIENT
Start: 2024-12-02 | End: 2024-12-02

## 2024-12-02 RX ORDER — PROPOFOL 10 MG/ML
VIAL (ML) INTRAVENOUS
Status: DISCONTINUED | OUTPATIENT
Start: 2024-12-02 | End: 2024-12-02

## 2024-12-02 RX ORDER — PROPOFOL 10 MG/ML
VIAL (ML) INTRAVENOUS CONTINUOUS PRN
Status: DISCONTINUED | OUTPATIENT
Start: 2024-12-02 | End: 2024-12-02

## 2024-12-02 RX ADMIN — PROPOFOL 100 MG: 10 INJECTION, EMULSION INTRAVENOUS at 12:12

## 2024-12-02 RX ADMIN — PROPOFOL 175 MCG/KG/MIN: 10 INJECTION, EMULSION INTRAVENOUS at 12:12

## 2024-12-02 RX ADMIN — Medication 10 ML: at 12:12

## 2024-12-02 RX ADMIN — LIDOCAINE HYDROCHLORIDE 75 MG: 20 INJECTION INTRAVENOUS at 12:12

## 2024-12-02 RX ADMIN — GLYCOPYRROLATE 0.2 MG: 0.2 INJECTION, SOLUTION INTRAMUSCULAR; INTRAVENOUS at 12:12

## 2024-12-02 NOTE — PLAN OF CARE
Pt to bay 7 at 1230. VS stable on transfer. Handoff report from procedural team. Discharge instructions reviewed with patient. Verbalized understanding. Questions encouraged and answered. PIV removed before dc

## 2024-12-02 NOTE — TRANSFER OF CARE
Anesthesia Transfer of Care Note    Patient: Marcial Downing    Procedure(s) Performed: Procedure(s) (LRB):  COLONOSCOPY, SCREENING, LOW RISK PATIENT (N/A)    Patient location: PACU    Anesthesia Type: general    Transport from OR: Transported from OR on room air with adequate spontaneous ventilation    Post pain: adequate analgesia    Post assessment: no apparent anesthetic complications and tolerated procedure well    Post vital signs: stable    Level of consciousness: responds to stimulation and sedated    Nausea/Vomiting: no nausea/vomiting    Complications: none    Transfer of care protocol was followed      Last vitals: Visit Vitals  /78 (BP Location: Left arm, Patient Position: Lying)   Pulse 70   Temp 37 °C (98.6 °F) (Temporal)   Resp 16   SpO2 98%

## 2024-12-02 NOTE — PLAN OF CARE
Pt in preop bay 5, VSS, and IV inserted. Pt denies any open wounds on body or the use of any weight loss injections. Pt needs procedural consents signed, otherwise ready to roll.

## 2024-12-02 NOTE — PROVATION PATIENT INSTRUCTIONS
Discharge Summary/Instructions after an Endoscopic Procedure  Patient Name: Marcial Downing  Patient MRN: [id number]  Patient YOB: 1975  Monday, December 2, 2024  Trinh Najera MD  Dear patient,  As a result of recent federal legislation (The Federal Cures Act), you may   receive lab or pathology results from your procedure in your [facility]   account before your physician is able to contact you. Your physician or   their representative will relay the results to you with their   recommendations at their soonest availability.  Thank you,  RESTRICTIONS:  During your procedure today, you received medications for sedation.  These   medications may affect your judgment, balance and coordination.  Therefore,   for 24 hours, you have the following restrictions:   - DO NOT drive a car, operate machinery, make legal/financial decisions,   sign important papers or drink alcohol.    ACTIVITY:  Today: no heavy lifting, straining or running due to procedural   sedation/anesthesia.  The following day: return to full activity including work.  DIET:  Eat and drink normally unless instructed otherwise.     TREATMENT FOR COMMON SIDE EFFECTS:  - Mild abdominal pain, nausea, belching, bloating or excessive gas:  rest,   eat lightly and use a heating pad.  - Sore Throat: treat with throat lozenges and/or gargle with warm salt   water.  - Because air was used during the procedure, expelling large amounts of air   from your rectum or belching is normal.  - If a bowel prep was taken, you may not have a bowel movement for 1-3 days.    This is normal.  SYMPTOMS TO WATCH FOR AND REPORT TO YOUR PHYSICIAN:  1. Abdominal pain or bloating, other than gas cramps.  2. Chest pain.  3. Back pain.  4. Signs of infection such as: chills or fever occurring within 24 hours   after the procedure.  5. Rectal bleeding, which would show as bright red, maroon, or black stools.   (A tablespoon of blood from the rectum is not serious, especially  if   hemorrhoids are present.)  6. Vomiting.  7. Weakness or dizziness.  GO DIRECTLY TO THE NEAREST EMERGENCY ROOM IF YOU HAVE ANY OF THE FOLLOWING:      Difficulty breathing              Chills and/or fever over 101 F   Persistent vomiting and/or vomiting blood   Severe abdominal pain   Severe chest pain   Black, tarry stools   Bleeding- more than one tablespoon   Any other symptom or condition that you feel may need urgent attention  Your doctor recommends these additional instructions:  If any biopsies were taken, your doctors clinic will contact you in 1 to 2   weeks with any results.  - Discharge patient to home (ambulatory).   - Resume regular diet.   - Continue present medications.   - Repeat colonoscopy in 3 years for screening purposes.  For questions, problems or results please call your physician - Trinh Najera MD at Work:  [phone].  [facility] [place], EMERGENCY ROOM PHONE NUMBER: [phone]  IF A COMPLICATION OR EMERGENCY SITUATION ARISES AND YOU ARE UNABLE TO REACH   YOUR PHYSICIAN - GO DIRECTLY TO THE EMERGENCY ROOM.  Trinh Najera MD  12/2/2024 12:28:05 PM  This report has been verified and signed electronically.  Dear patient,  As a result of recent federal legislation (The Federal Cures Act), you may   receive lab or pathology results from your procedure in your [facility]   account before your physician is able to contact you. Your physician or   their representative will relay the results to you with their   recommendations at their soonest availability.  Thank you,  [employer]ATION

## 2024-12-02 NOTE — ANESTHESIA POSTPROCEDURE EVALUATION
Anesthesia Post Evaluation    Patient: Marcial Downing    Procedure(s) Performed: Procedure(s) (LRB):  COLONOSCOPY, SCREENING, LOW RISK PATIENT (N/A)    Final Anesthesia Type: general      Patient location during evaluation: PACU  Patient participation: Yes- Able to Participate  Level of consciousness: awake and alert  Post-procedure vital signs: reviewed and stable  Pain management: adequate  Airway patency: patent    PONV status at discharge: No PONV  Anesthetic complications: no      Cardiovascular status: stable  Respiratory status: spontaneous ventilation  Hydration status: euvolemic  Follow-up not needed.          Vitals Value Taken Time   /69 12/02/24 1233   Temp 36.6 °C (97.8 °F) 12/02/24 1230   Pulse 78 12/02/24 1245   Resp 18 12/02/24 1245   SpO2 100 % 12/02/24 1245   Vitals shown include unfiled device data.      Event Time   Out of Recovery 12:45:00         Pain/Prudence Score: Prudence Score: 9 (12/2/2024 12:30 PM)

## 2025-01-28 ENCOUNTER — PATIENT OUTREACH (OUTPATIENT)
Dept: ADMINISTRATIVE | Facility: HOSPITAL | Age: 50
End: 2025-01-28
Payer: COMMERCIAL